# Patient Record
Sex: MALE | Race: WHITE | NOT HISPANIC OR LATINO | Employment: OTHER | URBAN - METROPOLITAN AREA
[De-identification: names, ages, dates, MRNs, and addresses within clinical notes are randomized per-mention and may not be internally consistent; named-entity substitution may affect disease eponyms.]

---

## 2021-11-17 ENCOUNTER — TRANSCRIBE ORDERS (OUTPATIENT)
Dept: ADMINISTRATIVE | Facility: HOSPITAL | Age: 71
End: 2021-11-17

## 2021-11-17 DIAGNOSIS — I20.0 INTERMEDIATE CORONARY SYNDROME (HCC): Primary | ICD-10-CM

## 2021-11-24 ENCOUNTER — ANESTHESIA EVENT (OUTPATIENT)
Dept: GASTROENTEROLOGY | Facility: HOSPITAL | Age: 71
End: 2021-11-24

## 2021-11-24 ENCOUNTER — HOSPITAL ENCOUNTER (OUTPATIENT)
Facility: HOSPITAL | Age: 71
Discharge: HOME | End: 2021-11-25
Attending: INTERNAL MEDICINE | Admitting: INTERNAL MEDICINE

## 2021-11-24 ENCOUNTER — ANESTHESIA (OUTPATIENT)
Dept: GASTROENTEROLOGY | Facility: HOSPITAL | Age: 71
End: 2021-11-24

## 2021-11-24 DIAGNOSIS — I20.0 INTERMEDIATE CORONARY SYNDROME (HCC): ICD-10-CM

## 2021-11-24 DIAGNOSIS — Z87.898 HISTORY OF ULCER DISEASE: ICD-10-CM

## 2021-11-24 DIAGNOSIS — D64.9 ANEMIA, UNSPECIFIED TYPE: Primary | ICD-10-CM

## 2021-11-24 LAB
ABO GROUP BLD: NORMAL
ANION GAP SERPL CALCULATED.3IONS-SCNC: 11 MMOL/L (ref 5–15)
BLD GP AB SCN SERPL QL: NEGATIVE
BUN SERPL-MCNC: 15 MG/DL (ref 8–23)
BUN/CREAT SERPL: 18.1 (ref 7–25)
CALCIUM SPEC-SCNC: 8.7 MG/DL (ref 8.6–10.5)
CHLORIDE SERPL-SCNC: 109 MMOL/L (ref 98–107)
CO2 SERPL-SCNC: 24 MMOL/L (ref 22–29)
CREAT SERPL-MCNC: 0.83 MG/DL (ref 0.76–1.27)
DEPRECATED RDW RBC AUTO: 46.9 FL (ref 37–54)
ERYTHROCYTE [DISTWIDTH] IN BLOOD BY AUTOMATED COUNT: 13.6 % (ref 12.3–15.4)
FERRITIN SERPL-MCNC: 237.2 NG/ML (ref 30–400)
GFR SERPL CREATININE-BSD FRML MDRD: 91 ML/MIN/1.73
GLUCOSE BLDC GLUCOMTR-MCNC: 104 MG/DL (ref 70–130)
GLUCOSE BLDC GLUCOMTR-MCNC: 104 MG/DL (ref 70–130)
GLUCOSE BLDC GLUCOMTR-MCNC: 128 MG/DL (ref 70–130)
GLUCOSE BLDC GLUCOMTR-MCNC: 138 MG/DL (ref 70–130)
GLUCOSE SERPL-MCNC: 143 MG/DL (ref 65–99)
HCT VFR BLD AUTO: 22.8 % (ref 37.5–51)
HGB BLD-MCNC: 7.7 G/DL (ref 13–17.7)
IRON 24H UR-MRATE: 63 MCG/DL (ref 59–158)
IRON SATN MFR SERPL: 23 % (ref 20–50)
MCH RBC QN AUTO: 32.1 PG (ref 26.6–33)
MCHC RBC AUTO-ENTMCNC: 33.8 G/DL (ref 31.5–35.7)
MCV RBC AUTO: 95 FL (ref 79–97)
PLATELET # BLD AUTO: 266 10*3/MM3 (ref 140–450)
PMV BLD AUTO: 9 FL (ref 6–12)
POTASSIUM SERPL-SCNC: 3.5 MMOL/L (ref 3.5–5.2)
RBC # BLD AUTO: 2.4 10*6/MM3 (ref 4.14–5.8)
RH BLD: POSITIVE
SARS-COV-2 RDRP RESP QL NAA+PROBE: NORMAL
SODIUM SERPL-SCNC: 144 MMOL/L (ref 136–145)
T&S EXPIRATION DATE: NORMAL
TIBC SERPL-MCNC: 270 MCG/DL (ref 298–536)
TRANSFERRIN SERPL-MCNC: 181 MG/DL (ref 200–360)
WBC NRBC COR # BLD: 6.64 10*3/MM3 (ref 3.4–10.8)

## 2021-11-24 PROCEDURE — 63710000001 FERROUS SULFATE 325 (65 FE) MG TABLET: Performed by: INTERNAL MEDICINE

## 2021-11-24 PROCEDURE — A9270 NON-COVERED ITEM OR SERVICE: HCPCS | Performed by: INTERNAL MEDICINE

## 2021-11-24 PROCEDURE — 0 LIDOCAINE 1 % SOLUTION: Performed by: NURSE ANESTHETIST, CERTIFIED REGISTERED

## 2021-11-24 PROCEDURE — 43239 EGD BIOPSY SINGLE/MULTIPLE: CPT | Performed by: INTERNAL MEDICINE

## 2021-11-24 PROCEDURE — 63710000001 METFORMIN ER 500 MG TABLET SUSTAINED-RELEASE 24 HOUR: Performed by: INTERNAL MEDICINE

## 2021-11-24 PROCEDURE — 85027 COMPLETE CBC AUTOMATED: CPT | Performed by: INTERNAL MEDICINE

## 2021-11-24 PROCEDURE — 99204 OFFICE O/P NEW MOD 45 MIN: CPT | Performed by: PHYSICIAN ASSISTANT

## 2021-11-24 PROCEDURE — 83540 ASSAY OF IRON: CPT | Performed by: PHYSICIAN ASSISTANT

## 2021-11-24 PROCEDURE — G0378 HOSPITAL OBSERVATION PER HR: HCPCS

## 2021-11-24 PROCEDURE — 86850 RBC ANTIBODY SCREEN: CPT | Performed by: PHYSICIAN ASSISTANT

## 2021-11-24 PROCEDURE — 86901 BLOOD TYPING SEROLOGIC RH(D): CPT | Performed by: PHYSICIAN ASSISTANT

## 2021-11-24 PROCEDURE — 63710000001 AMLODIPINE 5 MG TABLET: Performed by: INTERNAL MEDICINE

## 2021-11-24 PROCEDURE — 25010000002 FUROSEMIDE PER 20 MG: Performed by: PHYSICIAN ASSISTANT

## 2021-11-24 PROCEDURE — 25010000002 PROPOFOL 10 MG/ML EMULSION: Performed by: NURSE ANESTHETIST, CERTIFIED REGISTERED

## 2021-11-24 PROCEDURE — 82962 GLUCOSE BLOOD TEST: CPT

## 2021-11-24 PROCEDURE — 63710000001 PANTOPRAZOLE 40 MG TABLET DELAYED-RELEASE: Performed by: INTERNAL MEDICINE

## 2021-11-24 PROCEDURE — 84466 ASSAY OF TRANSFERRIN: CPT | Performed by: PHYSICIAN ASSISTANT

## 2021-11-24 PROCEDURE — 88305 TISSUE EXAM BY PATHOLOGIST: CPT | Performed by: INTERNAL MEDICINE

## 2021-11-24 PROCEDURE — 87635 SARS-COV-2 COVID-19 AMP PRB: CPT | Performed by: INTERNAL MEDICINE

## 2021-11-24 PROCEDURE — 63710000001 RANOLAZINE 500 MG TABLET SUSTAINED-RELEASE 12 HOUR: Performed by: INTERNAL MEDICINE

## 2021-11-24 PROCEDURE — 63710000001 ATORVASTATIN 40 MG TABLET: Performed by: INTERNAL MEDICINE

## 2021-11-24 PROCEDURE — 86900 BLOOD TYPING SEROLOGIC ABO: CPT

## 2021-11-24 PROCEDURE — 36430 TRANSFUSION BLD/BLD COMPNT: CPT

## 2021-11-24 PROCEDURE — 86920 COMPATIBILITY TEST SPIN: CPT

## 2021-11-24 PROCEDURE — 63710000001 SUCRALFATE 1 G TABLET: Performed by: INTERNAL MEDICINE

## 2021-11-24 PROCEDURE — 63710000001 CLOPIDOGREL 75 MG TABLET: Performed by: INTERNAL MEDICINE

## 2021-11-24 PROCEDURE — 63710000001 TAMSULOSIN 0.4 MG CAPSULE: Performed by: INTERNAL MEDICINE

## 2021-11-24 PROCEDURE — 80048 BASIC METABOLIC PNL TOTAL CA: CPT | Performed by: INTERNAL MEDICINE

## 2021-11-24 PROCEDURE — P9016 RBC LEUKOCYTES REDUCED: HCPCS

## 2021-11-24 PROCEDURE — 86900 BLOOD TYPING SEROLOGIC ABO: CPT | Performed by: PHYSICIAN ASSISTANT

## 2021-11-24 PROCEDURE — 82728 ASSAY OF FERRITIN: CPT | Performed by: INTERNAL MEDICINE

## 2021-11-24 PROCEDURE — C9803 HOPD COVID-19 SPEC COLLECT: HCPCS

## 2021-11-24 PROCEDURE — 63710000001 GLIPIZIDE 10 MG TABLET: Performed by: INTERNAL MEDICINE

## 2021-11-24 RX ORDER — TAMSULOSIN HYDROCHLORIDE 0.4 MG/1
0.4 CAPSULE ORAL DAILY
COMMUNITY

## 2021-11-24 RX ORDER — ONDANSETRON 2 MG/ML
4 INJECTION INTRAMUSCULAR; INTRAVENOUS ONCE AS NEEDED
Status: DISCONTINUED | OUTPATIENT
Start: 2021-11-24 | End: 2021-11-24 | Stop reason: HOSPADM

## 2021-11-24 RX ORDER — GLIPIZIDE 10 MG/1
10 TABLET ORAL DAILY
COMMUNITY

## 2021-11-24 RX ORDER — FUROSEMIDE 10 MG/ML
40 INJECTION INTRAMUSCULAR; INTRAVENOUS ONCE
Status: COMPLETED | OUTPATIENT
Start: 2021-11-24 | End: 2021-11-24

## 2021-11-24 RX ORDER — ASPIRIN 325 MG
325 TABLET, DELAYED RELEASE (ENTERIC COATED) ORAL DAILY
Status: DISCONTINUED | OUTPATIENT
Start: 2021-11-24 | End: 2021-11-25

## 2021-11-24 RX ORDER — LOSARTAN POTASSIUM 100 MG/1
1 TABLET ORAL DAILY
COMMUNITY

## 2021-11-24 RX ORDER — TAMSULOSIN HYDROCHLORIDE 0.4 MG/1
0.4 CAPSULE ORAL NIGHTLY
Status: DISCONTINUED | OUTPATIENT
Start: 2021-11-24 | End: 2021-11-25 | Stop reason: HOSPADM

## 2021-11-24 RX ORDER — LABETALOL HYDROCHLORIDE 5 MG/ML
5 INJECTION, SOLUTION INTRAVENOUS
Status: DISCONTINUED | OUTPATIENT
Start: 2021-11-24 | End: 2021-11-24 | Stop reason: HOSPADM

## 2021-11-24 RX ORDER — ATORVASTATIN CALCIUM 40 MG/1
1 TABLET, FILM COATED ORAL NIGHTLY
COMMUNITY

## 2021-11-24 RX ORDER — PANTOPRAZOLE SODIUM 40 MG/1
40 TABLET, DELAYED RELEASE ORAL 2 TIMES DAILY
Status: DISCONTINUED | OUTPATIENT
Start: 2021-11-24 | End: 2021-11-25 | Stop reason: HOSPADM

## 2021-11-24 RX ORDER — PANTOPRAZOLE SODIUM 40 MG/1
40 TABLET, DELAYED RELEASE ORAL 2 TIMES DAILY
COMMUNITY

## 2021-11-24 RX ORDER — HYDROCHLOROTHIAZIDE 25 MG/1
25 TABLET ORAL DAILY
Status: DISCONTINUED | OUTPATIENT
Start: 2021-11-25 | End: 2021-11-25 | Stop reason: HOSPADM

## 2021-11-24 RX ORDER — METOPROLOL SUCCINATE 25 MG/1
1 TABLET, EXTENDED RELEASE ORAL DAILY
COMMUNITY

## 2021-11-24 RX ORDER — HYDROCODONE BITARTRATE AND ACETAMINOPHEN 10; 325 MG/1; MG/1
1 TABLET ORAL 4 TIMES DAILY PRN
Status: DISCONTINUED | OUTPATIENT
Start: 2021-11-24 | End: 2021-11-25 | Stop reason: HOSPADM

## 2021-11-24 RX ORDER — RANOLAZINE 500 MG/1
500 TABLET, EXTENDED RELEASE ORAL 2 TIMES DAILY
COMMUNITY

## 2021-11-24 RX ORDER — CLOPIDOGREL BISULFATE 75 MG/1
1 TABLET ORAL DAILY
COMMUNITY

## 2021-11-24 RX ORDER — AMLODIPINE BESYLATE 5 MG/1
5 TABLET ORAL DAILY
COMMUNITY

## 2021-11-24 RX ORDER — FERROUS SULFATE 325(65) MG
325 TABLET ORAL DAILY
Status: DISCONTINUED | OUTPATIENT
Start: 2021-11-24 | End: 2021-11-25 | Stop reason: HOSPADM

## 2021-11-24 RX ORDER — ZOLPIDEM TARTRATE 5 MG/1
10 TABLET ORAL NIGHTLY PRN
Status: DISCONTINUED | OUTPATIENT
Start: 2021-11-24 | End: 2021-11-25 | Stop reason: HOSPADM

## 2021-11-24 RX ORDER — METOPROLOL SUCCINATE 25 MG/1
25 TABLET, EXTENDED RELEASE ORAL DAILY
Status: DISCONTINUED | OUTPATIENT
Start: 2021-11-25 | End: 2021-11-25

## 2021-11-24 RX ORDER — FERROUS SULFATE 325(65) MG
1 TABLET ORAL DAILY
COMMUNITY

## 2021-11-24 RX ORDER — GLIPIZIDE 10 MG/1
10 TABLET ORAL DAILY
Status: DISCONTINUED | OUTPATIENT
Start: 2021-11-24 | End: 2021-11-25 | Stop reason: HOSPADM

## 2021-11-24 RX ORDER — METFORMIN HYDROCHLORIDE 500 MG/1
1000 TABLET, EXTENDED RELEASE ORAL 2 TIMES DAILY
Status: DISCONTINUED | OUTPATIENT
Start: 2021-11-24 | End: 2021-11-25 | Stop reason: HOSPADM

## 2021-11-24 RX ORDER — HYDROCHLOROTHIAZIDE 25 MG/1
1 TABLET ORAL DAILY
COMMUNITY

## 2021-11-24 RX ORDER — PROPOFOL 10 MG/ML
VIAL (ML) INTRAVENOUS AS NEEDED
Status: DISCONTINUED | OUTPATIENT
Start: 2021-11-24 | End: 2021-11-24 | Stop reason: SURG

## 2021-11-24 RX ORDER — LIDOCAINE HYDROCHLORIDE 10 MG/ML
INJECTION, SOLUTION INFILTRATION; PERINEURAL AS NEEDED
Status: DISCONTINUED | OUTPATIENT
Start: 2021-11-24 | End: 2021-11-24 | Stop reason: SURG

## 2021-11-24 RX ORDER — METFORMIN HYDROCHLORIDE 500 MG/1
1000 TABLET, EXTENDED RELEASE ORAL 2 TIMES DAILY
COMMUNITY

## 2021-11-24 RX ORDER — ZOLPIDEM TARTRATE 10 MG/1
1 TABLET ORAL NIGHTLY PRN
COMMUNITY

## 2021-11-24 RX ORDER — CLOPIDOGREL BISULFATE 75 MG/1
75 TABLET ORAL DAILY
Status: DISCONTINUED | OUTPATIENT
Start: 2021-11-24 | End: 2021-11-25 | Stop reason: HOSPADM

## 2021-11-24 RX ORDER — LOSARTAN POTASSIUM 50 MG/1
100 TABLET ORAL DAILY
Status: DISCONTINUED | OUTPATIENT
Start: 2021-11-25 | End: 2021-11-25 | Stop reason: HOSPADM

## 2021-11-24 RX ORDER — NICOTINE POLACRILEX 4 MG
15 LOZENGE BUCCAL
Status: DISCONTINUED | OUTPATIENT
Start: 2021-11-24 | End: 2021-11-25 | Stop reason: HOSPADM

## 2021-11-24 RX ORDER — ATORVASTATIN CALCIUM 40 MG/1
40 TABLET, FILM COATED ORAL NIGHTLY
Status: DISCONTINUED | OUTPATIENT
Start: 2021-11-24 | End: 2021-11-25 | Stop reason: HOSPADM

## 2021-11-24 RX ORDER — SUCRALFATE 1 G/1
1 TABLET ORAL 4 TIMES DAILY
COMMUNITY
End: 2021-12-15

## 2021-11-24 RX ORDER — RANOLAZINE 500 MG/1
500 TABLET, EXTENDED RELEASE ORAL 2 TIMES DAILY
Status: DISCONTINUED | OUTPATIENT
Start: 2021-11-24 | End: 2021-11-25

## 2021-11-24 RX ORDER — AMLODIPINE BESYLATE 5 MG/1
5 TABLET ORAL DAILY
Status: DISCONTINUED | OUTPATIENT
Start: 2021-11-24 | End: 2021-11-25

## 2021-11-24 RX ORDER — SUCRALFATE 1 G/1
1 TABLET ORAL
Status: DISCONTINUED | OUTPATIENT
Start: 2021-11-24 | End: 2021-11-25 | Stop reason: HOSPADM

## 2021-11-24 RX ORDER — HYDROCODONE BITARTRATE AND ACETAMINOPHEN 10; 325 MG/1; MG/1
1 TABLET ORAL 4 TIMES DAILY PRN
COMMUNITY

## 2021-11-24 RX ORDER — DEXTROSE MONOHYDRATE 25 G/50ML
25 INJECTION, SOLUTION INTRAVENOUS
Status: DISCONTINUED | OUTPATIENT
Start: 2021-11-24 | End: 2021-11-25 | Stop reason: HOSPADM

## 2021-11-24 RX ORDER — SODIUM CHLORIDE, SODIUM LACTATE, POTASSIUM CHLORIDE, CALCIUM CHLORIDE 600; 310; 30; 20 MG/100ML; MG/100ML; MG/100ML; MG/100ML
INJECTION, SOLUTION INTRAVENOUS CONTINUOUS PRN
Status: DISCONTINUED | OUTPATIENT
Start: 2021-11-24 | End: 2021-11-24 | Stop reason: SURG

## 2021-11-24 RX ADMIN — PROPOFOL 70 MG: 10 INJECTION, EMULSION INTRAVENOUS at 15:20

## 2021-11-24 RX ADMIN — SUCRALFATE 1 G: 1 TABLET ORAL at 20:08

## 2021-11-24 RX ADMIN — AMLODIPINE BESYLATE 5 MG: 5 TABLET ORAL at 17:25

## 2021-11-24 RX ADMIN — CLOPIDOGREL BISULFATE 75 MG: 75 TABLET ORAL at 17:25

## 2021-11-24 RX ADMIN — GLIPIZIDE 10 MG: 10 TABLET ORAL at 17:25

## 2021-11-24 RX ADMIN — PANTOPRAZOLE SODIUM 40 MG: 40 TABLET, DELAYED RELEASE ORAL at 20:09

## 2021-11-24 RX ADMIN — RANOLAZINE 500 MG: 500 TABLET, EXTENDED RELEASE ORAL at 20:08

## 2021-11-24 RX ADMIN — METFORMIN HYDROCHLORIDE 1000 MG: 500 TABLET, EXTENDED RELEASE ORAL at 20:08

## 2021-11-24 RX ADMIN — FUROSEMIDE 40 MG: 40 INJECTION, SOLUTION INTRAMUSCULAR; INTRAVENOUS at 17:25

## 2021-11-24 RX ADMIN — PROPOFOL 70 MG: 10 INJECTION, EMULSION INTRAVENOUS at 15:16

## 2021-11-24 RX ADMIN — TAMSULOSIN HYDROCHLORIDE 0.4 MG: 0.4 CAPSULE ORAL at 20:09

## 2021-11-24 RX ADMIN — LIDOCAINE HYDROCHLORIDE 70 MG: 10 INJECTION, SOLUTION INFILTRATION; PERINEURAL at 15:16

## 2021-11-24 RX ADMIN — SODIUM CHLORIDE, POTASSIUM CHLORIDE, SODIUM LACTATE AND CALCIUM CHLORIDE: 600; 310; 30; 20 INJECTION, SOLUTION INTRAVENOUS at 15:11

## 2021-11-24 RX ADMIN — FERROUS SULFATE TAB 325 MG (65 MG ELEMENTAL FE) 325 MG: 325 (65 FE) TAB at 17:25

## 2021-11-24 RX ADMIN — ATORVASTATIN CALCIUM 40 MG: 40 TABLET, FILM COATED ORAL at 20:09

## 2021-11-24 RX ADMIN — SUCRALFATE 1 G: 1 TABLET ORAL at 17:25

## 2021-11-24 NOTE — ANESTHESIA POSTPROCEDURE EVALUATION
Patient: Pedro Wilson    Procedure Summary     Date: 11/24/21 Room / Location:  [facility]    Anesthesia Start: 1510 Anesthesia Stop:     Procedure: ESOPHAGOGASTRODUODENOSCOPY (N/A ) Diagnosis:     Surgeons: Lance Walton MD Provider: Bib Cruz MD    Anesthesia Type: general ASA Status: 3          Anesthesia Type: general    Vitals  Vitals Value Taken Time   /74 11/24/21 1531   Temp 97.9 °F (36.6 °C) 11/24/21 1531   Pulse 57 11/24/21 1531   Resp 24 11/24/21 1531   SpO2 100 % 11/24/21 1531           Post Anesthesia Care and Evaluation    Patient location during evaluation: PACU  Patient participation: complete - patient participated  Level of consciousness: awake and alert  Pain score: 0  Pain management: adequate  Airway patency: patent  Anesthetic complications: No anesthetic complications  PONV Status: none  Cardiovascular status: hemodynamically stable and acceptable  Respiratory status: nonlabored ventilation, acceptable and nasal cannula  Hydration status: acceptable    Comments: Pt transferred to PACU with O2. Vital signs stable. Report to PACU RN and care accepted.

## 2021-11-24 NOTE — ANESTHESIA PREPROCEDURE EVALUATION
Anesthesia Evaluation     Patient summary reviewed and Nursing notes reviewed   no history of anesthetic complications:  NPO Solid Status: > 8 hours  NPO Liquid Status: > 8 hours           Airway   Mallampati: II  TM distance: >3 FB  Neck ROM: full  No difficulty expected  Dental      Pulmonary - negative pulmonary ROS and normal exam   Cardiovascular - normal exam    (+) hypertension, past MI , CAD, CABG, angina, hyperlipidemia,  carotid artery disease      Neuro/Psych  (+) TIA,     GI/Hepatic/Renal/Endo    (+)  GERD, GI bleeding , diabetes mellitus,     Musculoskeletal     (+) back pain,   Abdominal    Substance History      OB/GYN          Other                        Anesthesia Plan    ASA 3     general     intravenous induction     Anesthetic plan, all risks, benefits, and alternatives have been provided, discussed and informed consent has been obtained with: patient.    Plan discussed with CRNA.

## 2021-11-25 VITALS
OXYGEN SATURATION: 97 % | TEMPERATURE: 98.5 F | SYSTOLIC BLOOD PRESSURE: 136 MMHG | WEIGHT: 172.6 LBS | HEIGHT: 70 IN | DIASTOLIC BLOOD PRESSURE: 65 MMHG | HEART RATE: 66 BPM | RESPIRATION RATE: 16 BRPM | BODY MASS INDEX: 24.71 KG/M2

## 2021-11-25 LAB
BH BB BLOOD EXPIRATION DATE: NORMAL
BH BB BLOOD EXPIRATION DATE: NORMAL
BH BB BLOOD TYPE BARCODE: 6200
BH BB BLOOD TYPE BARCODE: 6200
BH BB DISPENSE STATUS: NORMAL
BH BB DISPENSE STATUS: NORMAL
BH BB PRODUCT CODE: NORMAL
BH BB PRODUCT CODE: NORMAL
BH BB UNIT NUMBER: NORMAL
BH BB UNIT NUMBER: NORMAL
CROSSMATCH INTERPRETATION: NORMAL
CROSSMATCH INTERPRETATION: NORMAL
DEPRECATED RDW RBC AUTO: 50.7 FL (ref 37–54)
ERYTHROCYTE [DISTWIDTH] IN BLOOD BY AUTOMATED COUNT: 15.6 % (ref 12.3–15.4)
FOLATE SERPL-MCNC: 13.1 NG/ML (ref 4.78–24.2)
GLUCOSE BLDC GLUCOMTR-MCNC: 193 MG/DL (ref 70–130)
GLUCOSE BLDC GLUCOMTR-MCNC: 84 MG/DL (ref 70–130)
HCT VFR BLD AUTO: 30 % (ref 37.5–51)
HEMOCCULT STL QL: NEGATIVE
HGB BLD-MCNC: 10.4 G/DL (ref 13–17.7)
MCH RBC QN AUTO: 30.9 PG (ref 26.6–33)
MCHC RBC AUTO-ENTMCNC: 34.7 G/DL (ref 31.5–35.7)
MCV RBC AUTO: 89 FL (ref 79–97)
PLATELET # BLD AUTO: 281 10*3/MM3 (ref 140–450)
PMV BLD AUTO: 9.3 FL (ref 6–12)
RBC # BLD AUTO: 3.37 10*6/MM3 (ref 4.14–5.8)
RETICS # AUTO: 0.04 10*6/MM3 (ref 0.02–0.13)
RETICS/RBC NFR AUTO: 1.17 % (ref 0.7–1.9)
UNIT  ABO: NORMAL
UNIT  ABO: NORMAL
UNIT  RH: NORMAL
UNIT  RH: NORMAL
VIT B12 BLD-MCNC: 346 PG/ML (ref 211–946)
WBC NRBC COR # BLD: 7.28 10*3/MM3 (ref 3.4–10.8)

## 2021-11-25 PROCEDURE — A9270 NON-COVERED ITEM OR SERVICE: HCPCS | Performed by: INTERNAL MEDICINE

## 2021-11-25 PROCEDURE — 63710000001 AMLODIPINE 5 MG TABLET: Performed by: INTERNAL MEDICINE

## 2021-11-25 PROCEDURE — 99203 OFFICE O/P NEW LOW 30 MIN: CPT | Performed by: INTERNAL MEDICINE

## 2021-11-25 PROCEDURE — 63710000001 ASPIRIN EC 325 MG TABLET DELAYED-RELEASE: Performed by: INTERNAL MEDICINE

## 2021-11-25 PROCEDURE — 63710000001 METOPROLOL SUCCINATE XL 25 MG TABLET SUSTAINED-RELEASE 24 HOUR: Performed by: INTERNAL MEDICINE

## 2021-11-25 PROCEDURE — 63710000001 RANOLAZINE 500 MG TABLET SUSTAINED-RELEASE 12 HOUR: Performed by: INTERNAL MEDICINE

## 2021-11-25 PROCEDURE — 82607 VITAMIN B-12: CPT | Performed by: INTERNAL MEDICINE

## 2021-11-25 PROCEDURE — 63710000001 CLOPIDOGREL 75 MG TABLET: Performed by: INTERNAL MEDICINE

## 2021-11-25 PROCEDURE — 63710000001 PANTOPRAZOLE 40 MG TABLET DELAYED-RELEASE: Performed by: INTERNAL MEDICINE

## 2021-11-25 PROCEDURE — 82272 OCCULT BLD FECES 1-3 TESTS: CPT | Performed by: INTERNAL MEDICINE

## 2021-11-25 PROCEDURE — 82962 GLUCOSE BLOOD TEST: CPT

## 2021-11-25 PROCEDURE — 63710000001 HYDROCODONE-ACETAMINOPHEN 10-325 MG TABLET: Performed by: INTERNAL MEDICINE

## 2021-11-25 PROCEDURE — 63710000001 LOSARTAN 50 MG TABLET: Performed by: INTERNAL MEDICINE

## 2021-11-25 PROCEDURE — 63710000001 HYDROCHLOROTHIAZIDE 25 MG TABLET: Performed by: INTERNAL MEDICINE

## 2021-11-25 PROCEDURE — G0378 HOSPITAL OBSERVATION PER HR: HCPCS

## 2021-11-25 PROCEDURE — 85027 COMPLETE CBC AUTOMATED: CPT | Performed by: INTERNAL MEDICINE

## 2021-11-25 PROCEDURE — 82746 ASSAY OF FOLIC ACID SERUM: CPT | Performed by: INTERNAL MEDICINE

## 2021-11-25 PROCEDURE — 85045 AUTOMATED RETICULOCYTE COUNT: CPT | Performed by: INTERNAL MEDICINE

## 2021-11-25 PROCEDURE — 63710000001 METFORMIN ER 500 MG TABLET SUSTAINED-RELEASE 24 HOUR: Performed by: INTERNAL MEDICINE

## 2021-11-25 PROCEDURE — 63710000001 GLIPIZIDE 10 MG TABLET: Performed by: INTERNAL MEDICINE

## 2021-11-25 PROCEDURE — 63710000001 FERROUS SULFATE 325 (65 FE) MG TABLET: Performed by: INTERNAL MEDICINE

## 2021-11-25 PROCEDURE — 63710000001 SUCRALFATE 1 G TABLET: Performed by: INTERNAL MEDICINE

## 2021-11-25 RX ORDER — RANOLAZINE 500 MG/1
1000 TABLET, EXTENDED RELEASE ORAL 2 TIMES DAILY
Status: DISCONTINUED | OUTPATIENT
Start: 2021-11-25 | End: 2021-11-25

## 2021-11-25 RX ORDER — ASPIRIN 81 MG/1
81 TABLET ORAL DAILY
Qty: 30 TABLET | Refills: 5 | Status: SHIPPED | OUTPATIENT
Start: 2021-11-26

## 2021-11-25 RX ORDER — AMLODIPINE BESYLATE 10 MG/1
10 TABLET ORAL DAILY
Status: DISCONTINUED | OUTPATIENT
Start: 2021-11-26 | End: 2021-11-25

## 2021-11-25 RX ORDER — METOPROLOL SUCCINATE 25 MG/1
25 TABLET, EXTENDED RELEASE ORAL
Status: DISCONTINUED | OUTPATIENT
Start: 2021-11-25 | End: 2021-11-25

## 2021-11-25 RX ORDER — RANOLAZINE 500 MG/1
500 TABLET, EXTENDED RELEASE ORAL 2 TIMES DAILY
Status: DISCONTINUED | OUTPATIENT
Start: 2021-11-25 | End: 2021-11-25 | Stop reason: HOSPADM

## 2021-11-25 RX ORDER — ASPIRIN 81 MG/1
81 TABLET ORAL DAILY
Status: DISCONTINUED | OUTPATIENT
Start: 2021-11-26 | End: 2021-11-25 | Stop reason: HOSPADM

## 2021-11-25 RX ORDER — AMLODIPINE BESYLATE 5 MG/1
5 TABLET ORAL DAILY
Status: DISCONTINUED | OUTPATIENT
Start: 2021-11-26 | End: 2021-11-25 | Stop reason: HOSPADM

## 2021-11-25 RX ADMIN — ASPIRIN 325 MG: 325 TABLET, COATED ORAL at 08:08

## 2021-11-25 RX ADMIN — HYDROCHLOROTHIAZIDE 25 MG: 25 TABLET ORAL at 08:08

## 2021-11-25 RX ADMIN — METOPROLOL SUCCINATE 25 MG: 25 TABLET, EXTENDED RELEASE ORAL at 08:08

## 2021-11-25 RX ADMIN — RANOLAZINE 500 MG: 500 TABLET, EXTENDED RELEASE ORAL at 08:08

## 2021-11-25 RX ADMIN — PANTOPRAZOLE SODIUM 40 MG: 40 TABLET, DELAYED RELEASE ORAL at 08:08

## 2021-11-25 RX ADMIN — SUCRALFATE 1 G: 1 TABLET ORAL at 08:08

## 2021-11-25 RX ADMIN — HYDROCODONE BITARTRATE AND ACETAMINOPHEN 1 TABLET: 10; 325 TABLET ORAL at 11:21

## 2021-11-25 RX ADMIN — METFORMIN HYDROCHLORIDE 1000 MG: 500 TABLET, EXTENDED RELEASE ORAL at 08:07

## 2021-11-25 RX ADMIN — GLIPIZIDE 10 MG: 10 TABLET ORAL at 08:08

## 2021-11-25 RX ADMIN — HYDROCODONE BITARTRATE AND ACETAMINOPHEN 1 TABLET: 10; 325 TABLET ORAL at 06:38

## 2021-11-25 RX ADMIN — FERROUS SULFATE TAB 325 MG (65 MG ELEMENTAL FE) 325 MG: 325 (65 FE) TAB at 08:08

## 2021-11-25 RX ADMIN — LOSARTAN POTASSIUM 100 MG: 50 TABLET, FILM COATED ORAL at 08:07

## 2021-11-25 RX ADMIN — CLOPIDOGREL BISULFATE 75 MG: 75 TABLET ORAL at 08:08

## 2021-11-25 RX ADMIN — AMLODIPINE BESYLATE 5 MG: 5 TABLET ORAL at 08:08

## 2021-11-29 LAB
CYTO UR: NORMAL
LAB AP CASE REPORT: NORMAL
LAB AP CLINICAL INFORMATION: NORMAL
PATH REPORT.FINAL DX SPEC: NORMAL
PATH REPORT.GROSS SPEC: NORMAL

## 2021-11-30 ENCOUNTER — TELEPHONE (OUTPATIENT)
Dept: GASTROENTEROLOGY | Facility: CLINIC | Age: 71
End: 2021-11-30

## 2021-11-30 DIAGNOSIS — K92.1 BLOOD IN STOOL: Primary | ICD-10-CM

## 2021-11-30 DIAGNOSIS — D64.9 ANEMIA, UNSPECIFIED TYPE: ICD-10-CM

## 2021-12-02 NOTE — PROGRESS NOTES
"[facility], [facility]    Chief Complaint  Chest Pain    Subjective    History of Present Illness {CC  Problem List  Visit  Diagnosis   Encounters  Notes  Medications  Labs  Result Review Imaging  Media :23}     Pedro Wilson presents to [facility] for   History of Present Illness     71-year-old male presented to [facility] on 11/24/2021 for chest pain.  Found to be anemic.  Heart cath was scheduled canceled due to anemia.  EGD completed no clear bleeding site.  Received 2 units of packed red blood cells.    Hx of CAD, HTN, HLP, DM.    Patient with history of aortic aneurysm on beta-blocker.  Noted to be bradycardic but stable.    Patient followed by Dr. Paez    No CP. Mild pressure (intermittent for several years).  Improved since being amlodipine and ranexa.  Baseline mild dizziness with change in position (for 10 years).  No syncope.  NO dyspnea.  Pt reports he is as his baseline.        Objective     Vital Signs:   Vitals:    12/03/21 0952 12/03/21 0954 12/03/21 0955   BP: 125/55 93/52 117/57   BP Location: Right arm Left arm Left arm   Patient Position: Sitting Standing Sitting   Cuff Size: Adult Adult    Pulse: 57 59 57   Resp:   15   Temp:   97.7 °F (36.5 °C)   TempSrc:   Temporal   SpO2: 94% 96% 96%   Weight:   80.1 kg (176 lb 8 oz)   Height:   177.8 cm (70\")     Body mass index is 25.33 kg/m².  Physical Exam  Vitals reviewed.   Constitutional:       General: He is not in acute distress.     Appearance: Normal appearance.   Cardiovascular:      Rate and Rhythm: Normal rate and regular rhythm.      Pulses:           Radial pulses are 2+ on the right side.        Dorsalis pedis pulses are 2+ on the right side.        Posterior tibial pulses are 2+ on the right side.      Heart sounds: Normal heart sounds.   Pulmonary:      Effort: Pulmonary effort is normal.      Breath sounds: Normal breath sounds.   Abdominal:      " Palpations: Abdomen is soft.      Tenderness: There is no abdominal tenderness.   Musculoskeletal:      Right lower leg: No edema.      Left lower leg: No edema.   Skin:     General: Skin is warm and dry.   Neurological:      Mental Status: He is alert.   Psychiatric:         Mood and Affect: Mood normal.         Behavior: Behavior is cooperative.              Result Review  Data Reviewed:{ Labs  Result Review  Imaging  Med Tab  Media :23}     Lab Results   Component Value Date    WBC 7.28 11/25/2021    HGB 10.4 (L) 11/25/2021    HCT 30.0 (L) 11/25/2021    MCV 89.0 11/25/2021     11/25/2021     Lab Results   Component Value Date    GLUCOSE 143 (H) 11/24/2021    CALCIUM 8.7 11/24/2021     11/24/2021    K 3.5 11/24/2021    CO2 24.0 11/24/2021     (H) 11/24/2021    BUN 15 11/24/2021    CREATININE 0.83 11/24/2021    EGFRIFNONA 91 11/24/2021    BCR 18.1 11/24/2021    ANIONGAP 11.0 11/24/2021     No results found for: TSH  Lab Results   Component Value Date    CHLPL 118 07/12/2021     Lab Results   Component Value Date    TRIG 80 07/12/2021     Lab Results   Component Value Date    HDL 44 07/12/2021     Lab Results   Component Value Date    LDL 58 07/12/2021     Plan to have repeat labs with PCP next week.      Assessment and Plan {CC Problem List  Visit Diagnosis  ROS  Review (Popup)  Health Maintenance  Quality  BestPractice  Medications  SmartSets  SnapShot Encounters  Media :23}   1. Chest pain, unspecified type  Baseline mild intermittent CP  Continue asa, statin, plavix,   ranexa    2. Primary hypertension  Losartan, HCTZ, metoprolol, amlodipine  Low normal  No s/s hypotenstion    3. Hyperlipidemia, unspecified hyperlipidemia type  statin    4. Bradycardia  Stable.    Pt to f/u with Philippe ramirez scheduled for continue management    Reviewed role of the [facility].  F/u as needed or as determined by cardiology.           Follow Up {Instructions Charge Capture  Follow-up  Communications :23}   Return if symptoms worsen or fail to improve.    Patient was given instructions and counseling regarding his condition or for health maintenance advice. Please see specific information pulled into the AVS if appropriate.  Patient was instructed to call the [facility] with any questions, concerns, or worsening symptoms.

## 2021-12-03 ENCOUNTER — OFFICE VISIT (OUTPATIENT)
Dept: CARDIOLOGY | Facility: HOSPITAL | Age: 71
End: 2021-12-03

## 2021-12-03 VITALS
HEIGHT: 70 IN | RESPIRATION RATE: 15 BRPM | TEMPERATURE: 97.7 F | DIASTOLIC BLOOD PRESSURE: 57 MMHG | OXYGEN SATURATION: 96 % | WEIGHT: 176.5 LBS | HEART RATE: 57 BPM | BODY MASS INDEX: 25.27 KG/M2 | SYSTOLIC BLOOD PRESSURE: 117 MMHG

## 2021-12-03 DIAGNOSIS — R07.9 CHEST PAIN, UNSPECIFIED TYPE: Primary | ICD-10-CM

## 2021-12-03 DIAGNOSIS — E78.5 HYPERLIPIDEMIA, UNSPECIFIED HYPERLIPIDEMIA TYPE: ICD-10-CM

## 2021-12-03 DIAGNOSIS — I10 PRIMARY HYPERTENSION: ICD-10-CM

## 2021-12-03 DIAGNOSIS — R00.1 BRADYCARDIA: ICD-10-CM

## 2021-12-03 PROCEDURE — 99214 OFFICE O/P EST MOD 30 MIN: CPT | Performed by: NURSE PRACTITIONER

## 2021-12-08 ENCOUNTER — PATIENT MESSAGE (OUTPATIENT)
Dept: GASTROENTEROLOGY | Facility: CLINIC | Age: 71
End: 2021-12-08

## 2021-12-09 NOTE — TELEPHONE ENCOUNTER
From: MEGHAN BONNER  To: Pedro Wilson  Sent: 12/8/2021 12:35 PM EST  Subject: OCCULT STOOL TEST ORDER    Mr Wilson,  I tried to call you back concerning your occult stool test. Dr Pope has already ordered Occult stool test to check to see if you have blood in your stool. Thanks TIARA Guzman

## 2021-12-15 ENCOUNTER — OFFICE VISIT (OUTPATIENT)
Dept: GASTROENTEROLOGY | Facility: CLINIC | Age: 71
End: 2021-12-15

## 2021-12-15 ENCOUNTER — LAB (OUTPATIENT)
Dept: LAB | Facility: HOSPITAL | Age: 71
End: 2021-12-15

## 2021-12-15 VITALS
SYSTOLIC BLOOD PRESSURE: 126 MMHG | TEMPERATURE: 98 F | DIASTOLIC BLOOD PRESSURE: 66 MMHG | HEIGHT: 70 IN | WEIGHT: 178.8 LBS | HEART RATE: 62 BPM | BODY MASS INDEX: 25.6 KG/M2 | OXYGEN SATURATION: 98 %

## 2021-12-15 DIAGNOSIS — K21.9 GASTROESOPHAGEAL REFLUX DISEASE WITHOUT ESOPHAGITIS: ICD-10-CM

## 2021-12-15 DIAGNOSIS — K59.04 CHRONIC IDIOPATHIC CONSTIPATION: ICD-10-CM

## 2021-12-15 DIAGNOSIS — D64.9 ANEMIA, UNSPECIFIED TYPE: ICD-10-CM

## 2021-12-15 DIAGNOSIS — D64.9 ANEMIA, UNSPECIFIED TYPE: Primary | ICD-10-CM

## 2021-12-15 PROBLEM — E11.9 DIABETES MELLITUS: Status: ACTIVE | Noted: 2017-08-30

## 2021-12-15 PROBLEM — J84.10 FIBROSIS OF LUNG (HCC): Status: ACTIVE | Noted: 2017-04-05

## 2021-12-15 PROBLEM — R00.1 SINUS BRADYCARDIA: Status: ACTIVE | Noted: 2018-06-15

## 2021-12-15 PROBLEM — I71.21 ANEURYSM OF ASCENDING AORTA (HCC): Status: ACTIVE | Noted: 2017-09-25

## 2021-12-15 PROBLEM — G89.29 CHRONIC BACK PAIN: Status: ACTIVE | Noted: 2017-08-30

## 2021-12-15 PROBLEM — Z95.828 PRESENCE OF STENT IN ARTERY: Status: ACTIVE | Noted: 2017-08-30

## 2021-12-15 PROBLEM — I10 HYPERTENSIVE DISORDER: Status: ACTIVE | Noted: 2017-08-30

## 2021-12-15 PROBLEM — M54.9 CHRONIC BACK PAIN: Status: ACTIVE | Noted: 2017-08-30

## 2021-12-15 PROBLEM — R60.0 EDEMA OF LOWER EXTREMITY: Status: ACTIVE | Noted: 2017-08-30

## 2021-12-15 PROBLEM — R01.1 SYSTOLIC MURMUR: Status: ACTIVE | Noted: 2017-08-30

## 2021-12-15 PROBLEM — R93.89 ABNORMAL COMPUTED TOMOGRAPHY SCAN: Status: ACTIVE | Noted: 2017-05-03

## 2021-12-15 PROBLEM — I20.9 ANGINA PECTORIS (HCC): Status: ACTIVE | Noted: 2017-08-30

## 2021-12-15 PROBLEM — Z86.73 HISTORY OF TRANSIENT ISCHEMIC ATTACK: Status: ACTIVE | Noted: 2017-08-30

## 2021-12-15 PROBLEM — R06.00 DYSPNEA: Status: ACTIVE | Noted: 2017-04-05

## 2021-12-15 LAB
BASOPHILS # BLD AUTO: 0.05 10*3/MM3 (ref 0–0.2)
BASOPHILS NFR BLD AUTO: 0.6 % (ref 0–1.5)
DEPRECATED RDW RBC AUTO: 45.4 FL (ref 37–54)
EOSINOPHIL # BLD AUTO: 0.5 10*3/MM3 (ref 0–0.4)
EOSINOPHIL NFR BLD AUTO: 6.3 % (ref 0.3–6.2)
ERYTHROCYTE [DISTWIDTH] IN BLOOD BY AUTOMATED COUNT: 13.5 % (ref 12.3–15.4)
HCT VFR BLD AUTO: 32.6 % (ref 37.5–51)
HGB BLD-MCNC: 10.7 G/DL (ref 13–17.7)
IMM GRANULOCYTES # BLD AUTO: 0.03 10*3/MM3 (ref 0–0.05)
IMM GRANULOCYTES NFR BLD AUTO: 0.4 % (ref 0–0.5)
LYMPHOCYTES # BLD AUTO: 2.04 10*3/MM3 (ref 0.7–3.1)
LYMPHOCYTES NFR BLD AUTO: 25.5 % (ref 19.6–45.3)
MCH RBC QN AUTO: 30.6 PG (ref 26.6–33)
MCHC RBC AUTO-ENTMCNC: 32.8 G/DL (ref 31.5–35.7)
MCV RBC AUTO: 93.1 FL (ref 79–97)
MONOCYTES # BLD AUTO: 0.6 10*3/MM3 (ref 0.1–0.9)
MONOCYTES NFR BLD AUTO: 7.5 % (ref 5–12)
NEUTROPHILS NFR BLD AUTO: 4.77 10*3/MM3 (ref 1.7–7)
NEUTROPHILS NFR BLD AUTO: 59.7 % (ref 42.7–76)
NRBC BLD AUTO-RTO: 0.1 /100 WBC (ref 0–0.2)
PLATELET # BLD AUTO: 320 10*3/MM3 (ref 140–450)
PMV BLD AUTO: 9.5 FL (ref 6–12)
RBC # BLD AUTO: 3.5 10*6/MM3 (ref 4.14–5.8)
WBC NRBC COR # BLD: 7.99 10*3/MM3 (ref 3.4–10.8)

## 2021-12-15 PROCEDURE — 85025 COMPLETE CBC W/AUTO DIFF WBC: CPT

## 2021-12-15 PROCEDURE — 99214 OFFICE O/P EST MOD 30 MIN: CPT | Performed by: NURSE PRACTITIONER

## 2021-12-15 PROCEDURE — 36415 COLL VENOUS BLD VENIPUNCTURE: CPT

## 2021-12-15 RX ORDER — ISOSORBIDE MONONITRATE 120 MG/1
TABLET, EXTENDED RELEASE ORAL
COMMUNITY
Start: 2021-11-16

## 2021-12-15 RX ORDER — FAMOTIDINE 20 MG/1
TABLET, FILM COATED ORAL
COMMUNITY
Start: 2021-10-27

## 2021-12-15 RX ORDER — GABAPENTIN 800 MG/1
TABLET ORAL
COMMUNITY
Start: 2021-12-11

## 2021-12-15 RX ORDER — ESCITALOPRAM OXALATE 10 MG/1
TABLET ORAL
COMMUNITY
Start: 2021-10-15 | End: 2022-01-21

## 2021-12-15 NOTE — PROGRESS NOTES
New Patient Consultation     Patient Name: Pedro Wilson  : 1950   MRN: [id number]     Chief Complaint:    Chief Complaint   Patient presents with   • Hospital Follow Up Visit     follow up on Anemia       History of Present Illness: Pedro Wilson is a 71 y.o. male who is here today for a Gastroenterology Consultation for anemia.    Pedro presented to [facility] with chest pain for planned left heart cath and was found to be profoundly anemic.  The heart cath was canceled and GI was consulted.  He underwent an EGD that did not show any clear bleeding site.  He did have 1 stool that was negative for blood.  [facility] did [facility] the stools [facility] but has not heard results yet.  Pedro admits to a 5-year history of iron deficiency anemia.  He has undergone multiple scopes and no bleed source has ever been found.  He has never seen a hematologist.  Pedro has chronic epigastric pain and reflux.  Currently on pantoprazole twice daily, Carafate, and Pepcid.  EGD shows no evidence of reflux.  He is on amlodipine.  He is planned to undergo heart cath  There is no melena hematochezia.  His last colonoscopy was in [place] about 2 years ago that was normal.  He did have an EGD in the past that showed some scant blood (gastritis?)  No source of bleeding was found.  Pedro reports a 5 year history of anemia.  Thus far has had a colonoscopy (about 3-5 years ago) and an EGD (several).  He has never had a capsule endoscopy.  He has had several negative occult stools in the past.    Jr does admit to chronic constipation.  Has been taking fiber Gummies which are somewhat helpful.  Has never tried MiraLAX.    Subjective      Review of Systems:   Review of Systems   Constitutional: Negative for activity change, appetite change, chills, diaphoresis, fever, unexpected weight gain and unexpected weight loss.   HENT: Negative for trouble swallowing.    Gastrointestinal: Positive for abdominal pain, constipation, GERD and  indigestion. Negative for abdominal distention, anal bleeding, blood in stool, diarrhea, nausea, rectal pain and vomiting.       Past Medical History:   Past Medical History:   Diagnosis Date   • Back pain    • BPH (benign prostatic hyperplasia)    • Carotid artery occlusion    • Coronary artery disease    • Diabetes mellitus (HCC)    • Gastric bleeding    • GERD (gastroesophageal reflux disease)    • Herniated lumbar intervertebral disc    • Hyperlipidemia    • Hypertension    • Old MI (myocardial infarction)    • TIA (transient ischemic attack)        Past Surgical History:   Past Surgical History:   Procedure Laterality Date   • CARDIAC CATHETERIZATION     • COLONOSCOPY     • CORONARY ARTERY BYPASS GRAFT     • ENDOSCOPY N/A 11/24/2021    Procedure: ESOPHAGOGASTRODUODENOSCOPY;  Surgeon: Lance Walton MD;  Location: [facility];  Service: Gastroenterology;  Laterality: N/A;       Family History:   Family History   Problem Relation Age of Onset   • Heart disease Mother    • Heart disease Father    • No Known Problems Sister    • No Known Problems Brother        Social History:   Social History     Socioeconomic History   • Marital status:    Tobacco Use   • Smoking status: Never Smoker   • Smokeless tobacco: Never Used   Vaping Use   • Vaping Use: Never used   Substance and Sexual Activity   • Alcohol use: Never   • Drug use: Never   • Sexual activity: Defer       Alcohol/Tobacco History:   Social History     Substance and Sexual Activity   Alcohol Use Never     Social History     Tobacco Use   Smoking Status Never Smoker   Smokeless Tobacco Never Used       Medications:     Current Outpatient Medications:   •  amLODIPine (NORVASC) 5 MG tablet, Take 5 mg by mouth Daily., Disp: , Rfl:   •  aspirin 81 MG EC tablet, Take 1 tablet by mouth Daily., Disp: 30 tablet, Rfl: 5  •  atorvastatin (LIPITOR) 40 MG tablet, Take 1 tablet by mouth Every Night., Disp: , Rfl:   •  clopidogrel (Plavix) 75 MG tablet, Take  "1 tablet by mouth Daily., Disp: , Rfl:   •  escitalopram (LEXAPRO) 10 MG tablet, , Disp: , Rfl:   •  famotidine (PEPCID) 20 MG tablet, , Disp: , Rfl:   •  ferrous sulfate 325 (65 FE) MG tablet, Take 1 tablet by mouth Daily., Disp: , Rfl:   •  gabapentin (NEURONTIN) 800 MG tablet, , Disp: , Rfl:   •  glipizide (GLUCOTROL) 10 MG tablet, Take 10 mg by mouth Daily., Disp: , Rfl:   •  hydroCHLOROthiazide (HYDRODIURIL) 25 MG tablet, Take 1 tablet by mouth Daily., Disp: , Rfl:   •  HYDROcodone-acetaminophen (NORCO)  MG per tablet, Take 1 tablet by mouth 4 (Four) Times a Day As Needed for Moderate Pain ., Disp: , Rfl:   •  isosorbide mononitrate (IMDUR) 120 MG 24 hr tablet, , Disp: , Rfl:   •  losartan (COZAAR) 100 MG tablet, Take 1 tablet by mouth Daily., Disp: , Rfl:   •  metFORMIN ER (GLUCOPHAGE-XR) 500 MG 24 hr tablet, Take 1,000 mg by mouth 2 (Two) Times a Day., Disp: , Rfl:   •  metoprolol succinate XL (TOPROL-XL) 25 MG 24 hr tablet, Take 1 tablet by mouth Daily., Disp: , Rfl:   •  pantoprazole (PROTONIX) 40 MG EC tablet, Take 40 mg by mouth 2 (Two) Times a Day., Disp: , Rfl:   •  ranolazine (RANEXA) 500 MG 12 hr tablet, Take 500 mg by mouth 2 (Two) Times a Day., Disp: , Rfl:   •  tamsulosin (FLOMAX) 0.4 MG capsule 24 hr capsule, Take 0.4 mg by mouth Daily., Disp: , Rfl:   •  zolpidem (AMBIEN) 10 MG tablet, Take 1 tablet by mouth At Night As Needed for Sleep., Disp: , Rfl:     Allergies:   Allergies   Allergen Reactions   • Aspirin GI Bleeding       Objective     Physical Exam:  Vital Signs:   Vitals:    12/15/21 1022   BP: 126/66   BP Location: Left arm   Patient Position: Sitting   Cuff Size: Adult   Pulse: 62   Temp: 98 °F (36.7 °C)   TempSrc: Temporal   SpO2: 98%   Weight: 81.1 kg (178 lb 12.8 oz)   Height: 177.8 cm (70\")     Body mass index is 25.66 kg/m².     Physical Exam  Vitals and nursing note reviewed.   Constitutional:       General: He is not in acute distress.     Appearance: He is well-developed. " He is not diaphoretic.   Eyes:      General: No scleral icterus.     Conjunctiva/sclera: Conjunctivae normal.   Neck:      Thyroid: No thyromegaly.   Cardiovascular:      Rate and Rhythm: Normal rate and regular rhythm.  Extrasystoles are present.  Pulmonary:      Effort: Pulmonary effort is normal.      Breath sounds: Rhonchi present.   Abdominal:      General: Bowel sounds are normal. There is no distension.      Palpations: Abdomen is soft.      Tenderness: There is no abdominal tenderness. There is no guarding or rebound.      Hernia: No hernia is present.   Musculoskeletal:      Cervical back: Neck supple.      Right lower leg: No edema.      Left lower leg: No edema.   Skin:     General: Skin is warm and dry.      Capillary Refill: Capillary refill takes 2 to 3 seconds.      Coloration: Skin is not jaundiced or pale.      Findings: No bruising or petechiae.      Nails: There is no clubbing.   Neurological:      Mental Status: He is alert and oriented to person, place, and time.   Psychiatric:         Behavior: Behavior normal.         Thought Content: Thought content normal.         Judgment: Judgment normal.     Reviewed labs, reviewed hospital H&P, reviewed endoscopy report    Assessment / Plan      Assessment/Plan:   Diagnoses and all orders for this visit:    1. Anemia, unspecified type (Primary)  -     CBC & Differential; Future  -     Ambulatory Referral to Gastroenterology  -We will plan for capsule study.  If occult stools are positive, will switch to colonoscopy.  Occ[facility] are still in process [facility]  Capsule normal, will refer to hematology  2. Chronic idiopathic constipation  Increase dietary fiber, hydration, activity.  Start MiraLAX once daily  3. Gastroesophageal reflux disease without esophagitis  Suspect some of his indigestion and epigastric discomfort may be cardiac in nature and agree with need for heart cath.  Will discontinue Carafate.  Continue with PPI and Pepcid for now         Follow  Up: After capsule endoscopy  No follow-ups on file.    Plan of care reviewed with the patient at the conclusion of today's visit.  Education was provided regarding diagnosis, management, and any prescribed or recommended OTC medications.  Patient verbalized understanding of and agreement with management plan.     Time Statement:   Discussed plan of care in detail with patient today. Patient verbally understands and agrees. I have spent 30 minutes reviewing available diagnostics, obtaining history, examining the patient, developing a treatment plan, and educating the patient on disease process and plan of care.    MITESH Rutledge  [facility]

## 2021-12-17 ENCOUNTER — TELEPHONE (OUTPATIENT)
Dept: GASTROENTEROLOGY | Facility: CLINIC | Age: 71
End: 2021-12-17

## 2021-12-17 ENCOUNTER — PREP FOR SURGERY (OUTPATIENT)
Dept: OTHER | Facility: HOSPITAL | Age: 71
End: 2021-12-17

## 2021-12-17 DIAGNOSIS — D64.9 ANEMIA, UNSPECIFIED TYPE: Primary | ICD-10-CM

## 2021-12-17 RX ORDER — PEG-3350, SODIUM SULFATE, SODIUM CHLORIDE, POTASSIUM CHLORIDE, SODIUM ASCORBATE AND ASCORBIC ACID 7.5-2.691G
KIT ORAL
Qty: 1000 ML | Refills: 0 | Status: SHIPPED | OUTPATIENT
Start: 2021-12-17 | End: 2022-01-21

## 2021-12-17 RX ORDER — SIMETHICONE 20 MG/.3ML
200 EMULSION ORAL ONCE
Status: CANCELLED | OUTPATIENT
Start: 2021-12-17 | End: 2021-12-17

## 2021-12-22 ENCOUNTER — HOSPITAL ENCOUNTER (OUTPATIENT)
Dept: GASTROENTEROLOGY | Facility: HOSPITAL | Age: 71
Discharge: HOME | End: 2021-12-22
Attending: NEUROLOGICAL SURGERY | Admitting: NEUROLOGICAL SURGERY

## 2021-12-22 DIAGNOSIS — D64.9 ANEMIA, UNSPECIFIED TYPE: ICD-10-CM

## 2021-12-22 PROCEDURE — 63710000001 SIMETHICONE 40 MG/0.6ML SUSPENSION 30 ML BOTTLE: Performed by: NURSE PRACTITIONER

## 2021-12-22 PROCEDURE — A9270 NON-COVERED ITEM OR SERVICE: HCPCS | Performed by: NURSE PRACTITIONER

## 2021-12-22 RX ORDER — SIMETHICONE 20 MG/.3ML
200 EMULSION ORAL ONCE
Status: COMPLETED | OUTPATIENT
Start: 2021-12-22 | End: 2021-12-22

## 2021-12-22 RX ADMIN — SIMETHICONE 200 MG: 20 SUSPENSION/ DROPS ORAL at 07:32

## 2021-12-28 ENCOUNTER — TELEPHONE (OUTPATIENT)
Dept: GASTROENTEROLOGY | Facility: CLINIC | Age: 71
End: 2021-12-28

## 2021-12-28 NOTE — TELEPHONE ENCOUNTER
I tried to call Mr Wilson to give Pill Cam results. No answer; no voice mail set up. I will send result letter to patient's mychart and the mail.

## 2021-12-28 NOTE — TELEPHONE ENCOUNTER
----- Message from Lance Walton MD sent at 12/26/2021  1:29 PM EST -----  Regarding: Pill Cam  Please let patient know Pill Cam is normal.  No lesions or blood seen

## 2021-12-29 ENCOUNTER — TELEPHONE (OUTPATIENT)
Dept: GASTROENTEROLOGY | Facility: CLINIC | Age: 71
End: 2021-12-29

## 2021-12-29 ENCOUNTER — PATIENT MESSAGE (OUTPATIENT)
Dept: GASTROENTEROLOGY | Facility: CLINIC | Age: 71
End: 2021-12-29

## 2021-12-29 DIAGNOSIS — D64.9 ANEMIA, UNSPECIFIED TYPE: Primary | ICD-10-CM

## 2021-12-29 NOTE — TELEPHONE ENCOUNTER
From: MEGHAN BONNER  To: Pedro Wilson  Sent: 12/29/2021 9:10 AM EST  Subject: ENDOSCOPY GI CAPSULE RESULTS    Mr Wilson,    Your capsule is normal, no obvious sign of blood loss. We never got his occult stool results. Did you turn in your Occult stool specimen to the lab? Yasemin, is going to refer you to a hematologist to see if we can figure out why your showing signs of anemia. Thanks TIARA Guzman

## 2021-12-29 NOTE — TELEPHONE ENCOUNTER
I TRIED TO CALL MR HUERTA TO GIVE ENDOSCOPY GI CAPSULE RESULTS. NO ANSWER; PHONE LINE BUSY. UNABLE TO LEAVE MESSAGE.

## 2021-12-29 NOTE — TELEPHONE ENCOUNTER
----- Message from MITESH Lyle sent at 12/29/2021  7:58 AM EST -----  Can you let him know that his capsule is normal, no obvious sign of blood loss.  I never got his occult stool results.  Can you check on this and make sure he did them?  I am going to refer him to a hematologist to see if we can figure out why he is anemic.

## 2022-01-21 ENCOUNTER — LAB (OUTPATIENT)
Dept: LAB | Facility: HOSPITAL | Age: 72
End: 2022-01-21

## 2022-01-21 ENCOUNTER — CONSULT (OUTPATIENT)
Dept: ONCOLOGY | Facility: CLINIC | Age: 72
End: 2022-01-21

## 2022-01-21 VITALS
SYSTOLIC BLOOD PRESSURE: 121 MMHG | HEART RATE: 52 BPM | HEIGHT: 70 IN | WEIGHT: 180.1 LBS | DIASTOLIC BLOOD PRESSURE: 58 MMHG | RESPIRATION RATE: 16 BRPM | BODY MASS INDEX: 25.78 KG/M2 | OXYGEN SATURATION: 99 % | TEMPERATURE: 98 F

## 2022-01-21 DIAGNOSIS — Z13.6 ENCOUNTER FOR SCREENING FOR CARDIOVASCULAR DISORDERS: ICD-10-CM

## 2022-01-21 DIAGNOSIS — D64.9 ANEMIA, UNSPECIFIED TYPE: ICD-10-CM

## 2022-01-21 DIAGNOSIS — D64.9 ANEMIA, UNSPECIFIED TYPE: Primary | ICD-10-CM

## 2022-01-21 LAB
ALBUMIN SERPL-MCNC: 4.1 G/DL (ref 3.5–5.2)
ALBUMIN/GLOB SERPL: 1.1 G/DL
ALP SERPL-CCNC: 51 U/L (ref 39–117)
ALT SERPL W P-5'-P-CCNC: 18 U/L (ref 1–41)
ANION GAP SERPL CALCULATED.3IONS-SCNC: 9 MMOL/L (ref 5–15)
AST SERPL-CCNC: 17 U/L (ref 1–40)
BILIRUB CONJ SERPL-MCNC: <0.2 MG/DL (ref 0–0.3)
BILIRUB SERPL-MCNC: 0.4 MG/DL (ref 0–1.2)
BUN SERPL-MCNC: 12 MG/DL (ref 8–23)
BUN/CREAT SERPL: 13.6 (ref 7–25)
CALCIUM SPEC-SCNC: 9.4 MG/DL (ref 8.6–10.5)
CHLORIDE SERPL-SCNC: 104 MMOL/L (ref 98–107)
CHROMATIN AB SERPL-ACNC: <10 IU/ML (ref 0–14)
CO2 SERPL-SCNC: 25 MMOL/L (ref 22–29)
CREAT SERPL-MCNC: 0.88 MG/DL (ref 0.76–1.27)
CRP SERPL-MCNC: <0.3 MG/DL (ref 0–0.5)
DAT POLY-SP REAG RBC QL: NEGATIVE
ERYTHROCYTE [DISTWIDTH] IN BLOOD BY AUTOMATED COUNT: 14.7 % (ref 12.3–15.4)
ERYTHROCYTE [SEDIMENTATION RATE] IN BLOOD: 40 MM/HR (ref 0–20)
FERRITIN SERPL-MCNC: 341.3 NG/ML (ref 30–400)
FOLATE SERPL-MCNC: 18.3 NG/ML (ref 4.78–24.2)
GFR SERPL CREATININE-BSD FRML MDRD: 85 ML/MIN/1.73
GLOBULIN UR ELPH-MCNC: 3.6 GM/DL
GLUCOSE SERPL-MCNC: 114 MG/DL (ref 65–99)
HAPTOGLOB SERPL-MCNC: 144 MG/DL (ref 30–200)
HCT VFR BLD AUTO: 28.2 % (ref 37.5–51)
HCYS SERPL-MCNC: 11.2 UMOL/L (ref 0–15)
HGB BLD-MCNC: 9.3 G/DL (ref 13–17.7)
IRON 24H UR-MRATE: 68 MCG/DL (ref 59–158)
IRON SATN MFR SERPL: 22 % (ref 20–50)
LDH SERPL-CCNC: 156 U/L (ref 135–225)
LYMPHOCYTES # BLD AUTO: 1.9 10*3/MM3 (ref 0.7–3.1)
LYMPHOCYTES NFR BLD AUTO: 27.8 % (ref 19.6–45.3)
MCH RBC QN AUTO: 30.9 PG (ref 26.6–33)
MCHC RBC AUTO-ENTMCNC: 33 G/DL (ref 31.5–35.7)
MCV RBC AUTO: 93.5 FL (ref 79–97)
MONOCYTES # BLD AUTO: 0.5 10*3/MM3 (ref 0.1–0.9)
MONOCYTES NFR BLD AUTO: 7.6 % (ref 5–12)
NEUTROPHILS NFR BLD AUTO: 4.5 10*3/MM3 (ref 1.7–7)
NEUTROPHILS NFR BLD AUTO: 64.6 % (ref 42.7–76)
PLATELET # BLD AUTO: 325 10*3/MM3 (ref 140–450)
PMV BLD AUTO: 6.6 FL (ref 6–12)
POTASSIUM SERPL-SCNC: 4.3 MMOL/L (ref 3.5–5.2)
PROT SERPL-MCNC: 7.7 G/DL (ref 6–8.5)
RBC # BLD AUTO: 3.01 10*6/MM3 (ref 4.14–5.8)
RETICS # AUTO: 0.05 10*6/MM3 (ref 0.02–0.13)
RETICS/RBC NFR AUTO: 1.81 % (ref 0.7–1.9)
SODIUM SERPL-SCNC: 138 MMOL/L (ref 136–145)
TIBC SERPL-MCNC: 314 MCG/DL (ref 298–536)
TRANSFERRIN SERPL-MCNC: 211 MG/DL (ref 200–360)
VIT B12 BLD-MCNC: 337 PG/ML (ref 211–946)
WBC NRBC COR # BLD: 6.9 10*3/MM3 (ref 3.4–10.8)

## 2022-01-21 PROCEDURE — 83615 LACTATE (LD) (LDH) ENZYME: CPT

## 2022-01-21 PROCEDURE — 80053 COMPREHEN METABOLIC PANEL: CPT

## 2022-01-21 PROCEDURE — 85025 COMPLETE CBC W/AUTO DIFF WBC: CPT

## 2022-01-21 PROCEDURE — 84466 ASSAY OF TRANSFERRIN: CPT

## 2022-01-21 PROCEDURE — 86334 IMMUNOFIX E-PHORESIS SERUM: CPT

## 2022-01-21 PROCEDURE — 84165 PROTEIN E-PHORESIS SERUM: CPT

## 2022-01-21 PROCEDURE — 86880 COOMBS TEST DIRECT: CPT

## 2022-01-21 PROCEDURE — 83010 ASSAY OF HAPTOGLOBIN QUANT: CPT

## 2022-01-21 PROCEDURE — 85060 BLOOD SMEAR INTERPRETATION: CPT

## 2022-01-21 PROCEDURE — 82746 ASSAY OF FOLIC ACID SERUM: CPT

## 2022-01-21 PROCEDURE — 82955 ASSAY OF G6PD ENZYME: CPT

## 2022-01-21 PROCEDURE — 83070 ASSAY OF HEMOSIDERIN QUAL: CPT

## 2022-01-21 PROCEDURE — 83540 ASSAY OF IRON: CPT

## 2022-01-21 PROCEDURE — 86140 C-REACTIVE PROTEIN: CPT

## 2022-01-21 PROCEDURE — 82784 ASSAY IGA/IGD/IGG/IGM EACH: CPT

## 2022-01-21 PROCEDURE — 83921 ORGANIC ACID SINGLE QUANT: CPT

## 2022-01-21 PROCEDURE — 85041 AUTOMATED RBC COUNT: CPT

## 2022-01-21 PROCEDURE — 82248 BILIRUBIN DIRECT: CPT

## 2022-01-21 PROCEDURE — 86431 RHEUMATOID FACTOR QUANT: CPT

## 2022-01-21 PROCEDURE — 85652 RBC SED RATE AUTOMATED: CPT

## 2022-01-21 PROCEDURE — 83090 ASSAY OF HOMOCYSTEINE: CPT

## 2022-01-21 PROCEDURE — 82607 VITAMIN B-12: CPT

## 2022-01-21 PROCEDURE — 82728 ASSAY OF FERRITIN: CPT

## 2022-01-21 PROCEDURE — 82668 ASSAY OF ERYTHROPOIETIN: CPT

## 2022-01-21 PROCEDURE — 99204 OFFICE O/P NEW MOD 45 MIN: CPT | Performed by: INTERNAL MEDICINE

## 2022-01-21 PROCEDURE — 83051 HEMOGLOBIN PLASMA: CPT

## 2022-01-21 PROCEDURE — 85045 AUTOMATED RETICULOCYTE COUNT: CPT

## 2022-01-21 PROCEDURE — 83521 IG LIGHT CHAINS FREE EACH: CPT

## 2022-01-21 PROCEDURE — 36415 COLL VENOUS BLD VENIPUNCTURE: CPT

## 2022-01-21 NOTE — PROGRESS NOTES
"CHIEF COMPLAINT: Normochromic normocytic anemia    REASON FOR REFERRAL: Same      RECORDS OBTAINED  Records of the patients history including those obtained from [facility], [place] records were reviewed and summarized in detail.    Oncology/Hematology History Overview Note   1.  Normochromic normocytic anemia  2.  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  TIA with chart stating \"carotid artery occlusion\"  4.  Hyperlipidemia  5.  Hypertension  6.  Diabetes  7.  BPH  8.  Ascending aortic aneurysm according to the chart problem list  9.  History of bradycardia according to the problem list in the chart  10.  Fibrotic lung disease according to the problem list in the chart    Hematology history timeline:  -7/12/2021 hemoglobin 10.2 with normochromic normocytic indices and unremarkable CMP.  -11/24/2021 through 11/25/2021 inpatient hospitalization Dr. Pravin Pope.  Left heart cath planned but found to be anemic hemoglobin 7.7 with MCV 95 and normal white count and platelet count.  No differential.  BMP unremarkable.  Iron normal 63 with decreased transferrin 181 and decreased total iron binding capacity of 278 with iron saturation normal 23% and ferritin normal 237.2.  Folic acid normal 13.1.  B12 normal 346.  Hemoccult negative.  Reticulocyte count 1.17% EGD showed antral reactive changes with H. pylori negative.  Esophagus stomach and duodenum appeared normal.  Nonbleeding 12 mm duodenal diverticulum.  Per hospital records, the patient reportedly had upper endoscopy in [place] 1 year ago that showed evidence of chronic blood loss but details were not clear.  Last colonoscopy was in [place] 2-3 years ago unremarkable.  Denies change in the color caliber or consistency of his stools.  On aspirin.    -12/15/2021 hemoglobin up to 10.1 with unremarkable CBC.      -12/22/2021 capsule endoscopy normal.  Hematology referral made.    -1/21/2022 initial [facility] hematology consultation: With his longstanding history " of anemia according the patient, he will get his many of those records from [facility] as he can.  In the meantime I will do a hemolytic panel, megaloblastic panel, iron deficiency panel, and serum M panel but my suspicions for hemolysis, megaloblastic anemia, iron deficiency anemia, or plasma cell dyscrasia is fairly low with normal renal function and normal Red cell indices and Red cell distribution with.  I do suspect anemia of inflammation or less likely myelodysplasia.  The latter should also have elevated MCV but we may need to do a bone marrow biopsy if this first set of test is unrevealing.  In addition, though I do not palpate any hepatosplenomegaly we may do liver spleen ultrasound to look for sequestration but normally that would lower the white count and platelet count as well.     Anemia       HISTORY OF PRESENT ILLNESS:  The patient is a 71 y.o.  male, referred for normochromic normocytic anemia.  Hemoglobin in the sevens with a history according to the patient of anemia dating back decades.  He does not have that data from [facility] and does not know how low his counts are in the past but he has never been transfused until the time of his heart cath and has felt good since his transfusion at the time of his heart cath.    REVIEW OF SYSTEMS:  No change in the color caliber or consistency of the stools.  No jaundice icterus or rash.  Feeling stronger after transfusion.    Past Medical History:   Diagnosis Date   • Back pain    • BPH (benign prostatic hyperplasia)    • Carotid artery occlusion    • Coronary artery disease    • Diabetes mellitus (HCC)    • Gastric bleeding    • GERD (gastroesophageal reflux disease)    • Herniated lumbar intervertebral disc    • Hyperlipidemia    • Hypertension    • Old MI (myocardial infarction)    • TIA (transient ischemic attack)      Past Surgical History:   Procedure Laterality Date   • CARDIAC CATHETERIZATION     • COLONOSCOPY     • CORONARY ARTERY BYPASS  GRAFT     • ENDOSCOPY N/A 11/24/2021    Procedure: ESOPHAGOGASTRODUODENOSCOPY;  Surgeon: Lance Walton MD;  Location: [facility] ENDOSCOPY;  Service: Gastroenterology;  Laterality: N/A;   • HERNIA REPAIR         Current Outpatient Medications on File Prior to Visit   Medication Sig Dispense Refill   • amLODIPine (NORVASC) 5 MG tablet Take 5 mg by mouth Daily.     • aspirin 81 MG EC tablet Take 1 tablet by mouth Daily. 30 tablet 5   • atorvastatin (LIPITOR) 40 MG tablet Take 1 tablet by mouth Every Night.     • clopidogrel (Plavix) 75 MG tablet Take 1 tablet by mouth Daily.     • famotidine (PEPCID) 20 MG tablet      • ferrous sulfate 325 (65 FE) MG tablet Take 1 tablet by mouth Daily.     • gabapentin (NEURONTIN) 800 MG tablet      • glipizide (GLUCOTROL) 10 MG tablet Take 10 mg by mouth Daily.     • hydroCHLOROthiazide (HYDRODIURIL) 25 MG tablet Take 1 tablet by mouth Daily.     • HYDROcodone-acetaminophen (NORCO)  MG per tablet Take 1 tablet by mouth 4 (Four) Times a Day As Needed for Moderate Pain .     • isosorbide mononitrate (IMDUR) 120 MG 24 hr tablet      • losartan (COZAAR) 100 MG tablet Take 1 tablet by mouth Daily.     • metFORMIN ER (GLUCOPHAGE-XR) 500 MG 24 hr tablet Take 1,000 mg by mouth 2 (Two) Times a Day.     • metoprolol succinate XL (TOPROL-XL) 25 MG 24 hr tablet Take 1 tablet by mouth Daily.     • pantoprazole (PROTONIX) 40 MG EC tablet Take 40 mg by mouth 2 (Two) Times a Day.     • ranolazine (RANEXA) 500 MG 12 hr tablet Take 500 mg by mouth 2 (Two) Times a Day.     • tamsulosin (FLOMAX) 0.4 MG capsule 24 hr capsule Take 0.4 mg by mouth Daily.     • zolpidem (AMBIEN) 10 MG tablet Take 1 tablet by mouth At Night As Needed for Sleep.     • [DISCONTINUED] escitalopram (LEXAPRO) 10 MG tablet      • [DISCONTINUED] PEG-KCl-NaCl-NaSulf-Na Asc-C (MOVIPREP) 100 g reconstituted solution powder Starting at 5am on morning of procedure, drink 1/4 liter (250ml) every 15 minutes so full liter is consumed  "in 1 hour. 1000 mL 0     No current facility-administered medications on file prior to visit.       No Known Allergies    Social History     Socioeconomic History   • Marital status:    Tobacco Use   • Smoking status: Never Smoker   • Smokeless tobacco: Never Used   Vaping Use   • Vaping Use: Never used   Substance and Sexual Activity   • Alcohol use: Never   • Drug use: Never   • Sexual activity: Defer       Family History   Problem Relation Age of Onset   • Heart disease Mother    • Diabetes Mother    • Heart disease Father    • Breast cancer Sister    • No Known Problems Brother        PHYSICAL EXAM:  No jaundice or icterus.  No palpable hepatosplenomegaly.  No rashes.  Lungs are clear.    /58   Pulse 52   Temp 98 °F (36.7 °C) (Temporal)   Resp 16   Ht 177.8 cm (70\")   Wt 81.7 kg (180 lb 1.6 oz)   SpO2 99%   BMI 25.84 kg/m²     ECOG score: 0           Lab Results   Component Value Date    HGB 10.7 (L) 12/15/2021    HCT 32.6 (L) 12/15/2021    MCV 93.1 12/15/2021     12/15/2021    WBC 7.99 12/15/2021    NEUTROABS 4.77 12/15/2021    LYMPHSABS 2.04 12/15/2021    MONOSABS 0.60 12/15/2021    EOSABS 0.50 (H) 12/15/2021    BASOSABS 0.05 12/15/2021     Lab Results   Component Value Date    GLUCOSE 143 (H) 11/24/2021    BUN 15 11/24/2021    CREATININE 0.83 11/24/2021     11/24/2021    K 3.5 11/24/2021     (H) 11/24/2021    CO2 24.0 11/24/2021    CALCIUM 8.7 11/24/2021         Assessment/Plan     1.  Normochromic normocytic anemia  2.  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  TIA with chart stating \"carotid artery occlusion\"  4.  Hyperlipidemia  5.  Hypertension  6.  Diabetes  7.  BPH  8.  Ascending aortic aneurysm according to the chart problem list  9.  History of bradycardia according to the problem list in the chart  10.  Fibrotic lung disease according to the problem list in the chart    Hematology history timeline:  -7/12/2021 hemoglobin 10.2 with normochromic " normocytic indices and unremarkable CMP.  -11/24/2021 through 11/25/2021 inpatient hospitalization Dr. Pravin Pope.  Left heart cath planned but found to be anemic hemoglobin 7.7 with MCV 95 and normal white count and platelet count.  No differential.  BMP unremarkable.  Iron normal 63 with decreased transferrin 181 and decreased total iron binding capacity of 278 with iron saturation normal 23% and ferritin normal 237.2.  Folic acid normal 13.1.  B12 normal 346.  Hemoccult negative.  Reticulocyte count 1.17% EGD showed antral reactive changes with H. pylori negative.  Esophagus stomach and duodenum appeared normal.  Nonbleeding 12 mm duodenal diverticulum.  Per hospital records, the patient reportedly had upper endoscopy in [place] 1 year ago that showed evidence of chronic blood loss but details were not clear.  Last colonoscopy was in [place] 2-3 years ago unremarkable.  Denies change in the color caliber or consistency of his stools.  On aspirin.    -12/15/2021 hemoglobin up to 10.1 with unremarkable CBC.      -12/22/2021 capsule endoscopy normal.  Hematology referral made.    -1/21/2022 initial [facility] hematology consultation: With his longstanding history of anemia according the patient, he will get his many of those records from [facility] as he can.  In the meantime I will do a hemolytic panel, megaloblastic panel, iron deficiency panel, and serum M panel but my suspicions for hemolysis, megaloblastic anemia, iron deficiency anemia, or plasma cell dyscrasia is fairly low with normal renal function and normal Red cell indices and Red cell distribution with.  I do suspect anemia of inflammation or less likely myelodysplasia.  The latter should also have elevated MCV but we may need to do a bone marrow biopsy if this first set of test is unrevealing.  In addition, though I do not palpate any hepatosplenomegaly we may do liver spleen ultrasound to look for sequestration but normally that would lower  the white count and platelet count as well.      Total time of care today inclusive of time spent today prior to his arrival reviewing notes of cardiology and labs during his hospitalization and subsequent labs as outlined above and taking history and physical today and going over the broad differential diagnosis of anemia and work-up thereof and after visit putting forth the plan as outlined took 45 minutes of patient care time throughout the day today.  Drew Cantu MD    1/21/2022

## 2022-01-22 LAB
CYTOLOGIST CVX/VAG CYTO: NORMAL
PATH INTERP BLD-IMP: NORMAL

## 2022-01-24 LAB
ALBUMIN SERPL ELPH-MCNC: 3.6 G/DL (ref 2.9–4.4)
ALBUMIN/GLOB SERPL: 1 {RATIO} (ref 0.7–1.7)
ALPHA1 GLOB SERPL ELPH-MCNC: 0.2 G/DL (ref 0–0.4)
ALPHA2 GLOB SERPL ELPH-MCNC: 0.8 G/DL (ref 0.4–1)
B-GLOBULIN SERPL ELPH-MCNC: 1.1 G/DL (ref 0.7–1.3)
EPO SERPL-ACNC: 15.6 MIU/ML (ref 2.6–18.5)
GAMMA GLOB SERPL ELPH-MCNC: 1.7 G/DL (ref 0.4–1.8)
GLOBULIN SER-MCNC: 3.8 G/DL (ref 2.2–3.9)
HEMOSIDERIN UR QL: NEGATIVE
IGA SERPL-MCNC: 456 MG/DL (ref 61–437)
IGG SERPL-MCNC: 1873 MG/DL (ref 603–1613)
IGM SERPL-MCNC: 46 MG/DL (ref 15–143)
INTERPRETATION SERPL IEP-IMP: ABNORMAL
KAPPA LC FREE SER-MCNC: 64 MG/L (ref 3.3–19.4)
KAPPA LC FREE/LAMBDA FREE SER: 1.48 {RATIO} (ref 0.26–1.65)
LABORATORY COMMENT REPORT: ABNORMAL
LAMBDA LC FREE SERPL-MCNC: 43.2 MG/L (ref 5.7–26.3)
M PROTEIN SERPL ELPH-MCNC: ABNORMAL G/DL
PROT SERPL-MCNC: 7.4 G/DL (ref 6–8.5)

## 2022-01-25 LAB
G6PD BLD QN: 295 U/10E12 RBC (ref 127–427)
RBC # BLD AUTO: 3.05 X10E6/UL (ref 4.14–5.8)

## 2022-01-26 LAB — HGB FREE PLAS-MCNC: 4.8 MG/DL (ref 0–4.9)

## 2022-01-28 LAB — METHYLMALONATE SERPL-SCNC: 290 NMOL/L (ref 0–378)

## 2022-02-18 ENCOUNTER — OFFICE VISIT (OUTPATIENT)
Dept: ONCOLOGY | Facility: CLINIC | Age: 72
End: 2022-02-18

## 2022-02-18 ENCOUNTER — TELEPHONE (OUTPATIENT)
Dept: ONCOLOGY | Facility: CLINIC | Age: 72
End: 2022-02-18

## 2022-02-18 VITALS
RESPIRATION RATE: 16 BRPM | SYSTOLIC BLOOD PRESSURE: 136 MMHG | HEIGHT: 70 IN | HEART RATE: 65 BPM | OXYGEN SATURATION: 98 % | DIASTOLIC BLOOD PRESSURE: 76 MMHG | WEIGHT: 179 LBS | BODY MASS INDEX: 25.62 KG/M2 | TEMPERATURE: 96.2 F

## 2022-02-18 DIAGNOSIS — R09.89 OTHER SPECIFIED SYMPTOMS AND SIGNS INVOLVING THE CIRCULATORY AND RESPIRATORY SYSTEMS: ICD-10-CM

## 2022-02-18 DIAGNOSIS — D64.9 ANEMIA, UNSPECIFIED TYPE: Primary | Chronic | ICD-10-CM

## 2022-02-18 PROCEDURE — 99215 OFFICE O/P EST HI 40 MIN: CPT | Performed by: INTERNAL MEDICINE

## 2022-02-18 RX ORDER — SUCRALFATE 1 G/1
TABLET ORAL
COMMUNITY
Start: 2022-01-22

## 2022-02-18 RX ORDER — AMOXICILLIN AND CLAVULANATE POTASSIUM 562.5; 437.5; 62.5 MG/1; MG/1; MG/1
2 TABLET, FILM COATED, EXTENDED RELEASE ORAL 2 TIMES DAILY
COMMUNITY
Start: 2022-02-04

## 2022-02-18 RX ORDER — NITROGLYCERIN 0.4 MG/1
0.4 TABLET SUBLINGUAL
COMMUNITY
Start: 2022-02-03

## 2022-02-18 NOTE — TELEPHONE ENCOUNTER
Dr. Cantu confirmed with Dr. Brar that patient does not need to hold ASA or plavix prior to BM biopsy.

## 2022-02-18 NOTE — PROGRESS NOTES
"CHIEF COMPLAINT: Chronic mild normochromic normocytic anemia    Problem List:  Oncology/Hematology History Overview Note   1.  Normochromic normocytic anemia  2.  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  TIA with chart stating \"carotid artery occlusion\"  4.  Hyperlipidemia  5.  Hypertension  6.  Diabetes  7.  BPH  8.  Ascending aortic aneurysm according to the chart problem list  9.  History of bradycardia according to the problem list in the chart  10.  Fibrotic lung disease according to the problem list in the chart    Hematology history timeline:  -1/3/2020 hemoglobin 11.2 MCV 92.4 otherwise normal CBC and differential  -10/5/2020 hemoglobin 10.7 MCV 91.9 otherwise unremarkable CBC and differential.  -11/9/2020 hemoglobin 10.3 MCV 92.2 otherwise unremarkable CBC and differential.  -3/1/2021 hemoglobin 9.3 with MCV 93.7 otherwise unremarkable CBC and differential.  -7/12/2021 hemoglobin 10.2 with normochromic normocytic indices and unremarkable CMP.  -11/24/2021 through 11/25/2021 inpatient hospitalization Dr. Pravin Pope.  Left heart cath planned but found to be anemic hemoglobin 7.7 with MCV 95 and normal white count and platelet count.  No differential.  BMP unremarkable.  Iron normal 63 with decreased transferrin 181 and decreased total iron binding capacity of 278 with iron saturation normal 23% and ferritin normal 237.2.  Folic acid normal 13.1.  B12 normal 346.  Hemoccult negative.  Reticulocyte count 1.17% EGD showed antral reactive changes with H. pylori negative.  Esophagus stomach and duodenum appeared normal.  Nonbleeding 12 mm duodenal diverticulum.  Per hospital records, the patient reportedly had upper endoscopy in [place] 1 year ago that showed evidence of chronic blood loss but details were not clear.  Last colonoscopy was in [place] 2-3 years ago unremarkable.  Denies change in the color caliber or consistency of his stools.  On aspirin.    -12/15/2021 hemoglobin up to 10.1 with " unremarkable CBC.      -12/22/2021 capsule endoscopy normal.  Hematology referral made.    -1/21/2022 initial [facility] hematology consultation: With his longstanding history of anemia according the patient, he will get his many of those records from [facility] as he can.  In the meantime I will do a hemolytic panel, megaloblastic panel, iron deficiency panel, and serum M panel but my suspicions for hemolysis, megaloblastic anemia, iron deficiency anemia, or plasma cell dyscrasia is fairly low with normal renal function and normal Red cell indices and Red cell distribution with.  I do suspect anemia of inflammation or less likely myelodysplasia.  The latter should also have elevated MCV but we may need to do a bone marrow biopsy if this first set of test is unrevealing.  In addition, though I do not palpate any hepatosplenomegaly we may do liver spleen ultrasound to look for sequestration but normally that would lower the white count and platelet count as well.    -1/21/2022 data:  Hemoglobin 9.3 with MCV normal 93.5 and white count 6900 with normal platelets 325,000 and unremarkable differential.  CMP unremarkable save for glucose 114.  Normal bilirubin.  Normal liver enzymes and total protein and globulin.  Serum M panel normal save for sedimentation rate: Total IgG high at 1873 with IgA high at 456 upper limit 437.  No monoclonality.  Balanced elevation of kappa light chain 64 and lambda 43 with normal ratio 1.48.  Sedimentation rate elevated at 40 with C-reactive protein less than 0.3.  Megaloblastic panel negative: B12 normal 337 with methylmalonic acid normal 290.  Folate normal 18.3 with homocystine normal 11.2.  Hemolytic panel negative: Haptoglobin normal 144 with reticulocyte and adequately normal at 1.81%.  [place] negative.  Erythropoietin inappropriately normal at 15.6.    -2/18/2022 [facility] hematology follow-up visit: I reviewed and injured the data he brought dating back from January 2020 through March  2021.  I reviewed the above data that I drew on 1/21/2022 with him.  Other than for mild elevation of sedimentation rate, his serum M panel was negative.  Erythropoietin level is inappropriately normal suggesting that his marrow is not responding as does his normal retake count which should be elevated should he be losing blood or hemolyzing.  No evidence of hemolysis or megaloblastic process.  Hence no discernible reversible processes found on peripheral blood testing.  We will get his liver spleen ultrasound though serial CMP over the last 2 years has been normal and we will check a bone marrow biopsy to rule out myelodysplastic process.  Prior iron indices suggest normal iron.     Anemia       HISTORY OF PRESENT ILLNESS:  The patient is a 71 y.o. male, here for follow up on management of chronic mild normocytic normochromic anemia without GI blood loss    Past Medical History:   Diagnosis Date   • Back pain    • BPH (benign prostatic hyperplasia)    • Carotid artery occlusion    • Coronary artery disease    • Diabetes mellitus (HCC)    • Gastric bleeding    • GERD (gastroesophageal reflux disease)    • Herniated lumbar intervertebral disc    • Hyperlipidemia    • Hypertension    • Old MI (myocardial infarction)    • TIA (transient ischemic attack)      Past Surgical History:   Procedure Laterality Date   • CARDIAC CATHETERIZATION     • COLONOSCOPY     • CORONARY ARTERY BYPASS GRAFT     • ENDOSCOPY N/A 11/24/2021    Procedure: ESOPHAGOGASTRODUODENOSCOPY;  Surgeon: Lance Walton MD;  Location: [facility] ENDOSCOPY;  Service: Gastroenterology;  Laterality: N/A;   • HERNIA REPAIR         No Known Allergies    Family History and Social History reviewed and changed as necessary    REVIEW OF SYSTEM:   No new somatic complaint    PHYSICAL EXAM:  No jaundice or icterus.  No palpable hepatosplenomegaly    Vitals:    02/18/22 0942   BP: 136/76   Pulse: 65   Resp: 16   Temp: 96.2 °F (35.7 °C)   TempSrc: Temporal   SpO2: 98%  "  Weight: 81.2 kg (179 lb)   Height: 177.8 cm (70\")     Vitals:    02/18/22 0942   PainSc: 0-No pain          ECOG score: 0           Vitals reviewed.    ECOG: (0) Fully Active - Able to Carry On All Pre-disease Performance Without Restriction    Lab Results   Component Value Date    HGB 9.3 (L) 01/21/2022    HCT 28.2 (L) 01/21/2022    MCV 93.5 01/21/2022     01/21/2022    WBC 6.90 01/21/2022    NEUTROABS 4.50 01/21/2022    LYMPHSABS 1.90 01/21/2022    MONOSABS 0.50 01/21/2022    EOSABS 0.50 (H) 12/15/2021    BASOSABS 0.05 12/15/2021       Lab Results   Component Value Date    GLUCOSE 114 (H) 01/21/2022    BUN 12 01/21/2022    CREATININE 0.88 01/21/2022     01/21/2022    K 4.3 01/21/2022     01/21/2022    CO2 25.0 01/21/2022    CALCIUM 9.4 01/21/2022    PROTEINTOT 7.7 01/21/2022    ALBUMIN 4.10 01/21/2022    ALBUMIN 3.6 01/21/2022    BILITOT 0.4 01/21/2022    ALKPHOS 51 01/21/2022    AST 17 01/21/2022    ALT 18 01/21/2022             ASSESSMENT & PLAN:  1.  Normochromic normocytic anemia  2.  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  TIA with chart stating \"carotid artery occlusion\"  4.  Hyperlipidemia  5.  Hypertension  6.  Diabetes  7.  BPH  8.  Ascending aortic aneurysm according to the chart problem list  9.  History of bradycardia according to the problem list in the chart  10.  Fibrotic lung disease according to the problem list in the chart    Hematology history timeline:  -1/3/2020 hemoglobin 11.2 MCV 92.4 otherwise normal CBC and differential  -10/5/2020 hemoglobin 10.7 MCV 91.9 otherwise unremarkable CBC and differential.  -11/9/2020 hemoglobin 10.3 MCV 92.2 otherwise unremarkable CBC and differential.  -3/1/2021 hemoglobin 9.3 with MCV 93.7 otherwise unremarkable CBC and differential.  -7/12/2021 hemoglobin 10.2 with normochromic normocytic indices and unremarkable CMP.  -11/24/2021 through 11/25/2021 inpatient hospitalization Dr. Pravin Pope.  Left heart cath planned but " found to be anemic hemoglobin 7.7 with MCV 95 and normal white count and platelet count.  No differential.  BMP unremarkable.  Iron normal 63 with decreased transferrin 181 and decreased total iron binding capacity of 278 with iron saturation normal 23% and ferritin normal 237.2.  Folic acid normal 13.1.  B12 normal 346.  Hemoccult negative.  Reticulocyte count 1.17% EGD showed antral reactive changes with H. pylori negative.  Esophagus stomach and duodenum appeared normal.  Nonbleeding 12 mm duodenal diverticulum.  Per hospital records, the patient reportedly had upper endoscopy in [place] 1 year ago that showed evidence of chronic blood loss but details were not clear.  Last colonoscopy was in [place] 2-3 years ago unremarkable.  Denies change in the color caliber or consistency of his stools.  On aspirin.    -12/15/2021 hemoglobin up to 10.1 with unremarkable CBC.      -12/22/2021 capsule endoscopy normal.  Hematology referral made.    -1/21/2022 initial [facility] hematology consultation: With his longstanding history of anemia according the patient, he will get his many of those records from [facility] as he can.  In the meantime I will do a hemolytic panel, megaloblastic panel, iron deficiency panel, and serum M panel but my suspicions for hemolysis, megaloblastic anemia, iron deficiency anemia, or plasma cell dyscrasia is fairly low with normal renal function and normal Red cell indices and Red cell distribution with.  I do suspect anemia of inflammation or less likely myelodysplasia.  The latter should also have elevated MCV but we may need to do a bone marrow biopsy if this first set of test is unrevealing.  In addition, though I do not palpate any hepatosplenomegaly we may do liver spleen ultrasound to look for sequestration but normally that would lower the white count and platelet count as well.    -1/21/2022 data:  Hemoglobin 9.3 with MCV normal 93.5 and white count 6900 with normal platelets  325,000 and unremarkable differential.  CMP unremarkable save for glucose 114.  Normal bilirubin.  Normal liver enzymes and total protein and globulin.  Serum M panel normal save for sedimentation rate: Total IgG high at 1873 with IgA high at 456 upper limit 437.  No monoclonality.  Balanced elevation of kappa light chain 64 and lambda 43 with normal ratio 1.48.  Sedimentation rate elevated at 40 with C-reactive protein less than 0.3.  Megaloblastic panel negative: B12 normal 337 with methylmalonic acid normal 290.  Folate normal 18.3 with homocystine normal 11.2.  Hemolytic panel negative: Haptoglobin normal 144 with reticulocyte and adequately normal at 1.81%.  [place] negative.  Erythropoietin inappropriately normal at 15.6.    -2/18/2022 [facility] hematology follow-up visit: I reviewed and injured the data he brought dating back from January 2020 through March 2021.  I reviewed the above data that I drew on 1/21/2022 with him.  Other than for mild elevation of sedimentation rate, his serum M panel was negative.  Erythropoietin level is inappropriately normal suggesting that his marrow is not responding as does his normal retake count which should be elevated should he be losing blood or hemolyzing.  No evidence of hemolysis or megaloblastic process.  Hence no discernible reversible processes found on peripheral blood testing.  We will get his liver spleen ultrasound though serial CMP over the last 2 years has been normal and we will check a bone marrow biopsy to rule out myelodysplastic process.  Prior iron indices suggest normal iron.    Total time of care today inclusive of time spent today prior to his arrival reviewing interval data as outlined above and during visit translating this information to him and after visit putting forth the plan as outlined above took 45 minutes of patient care time throughout the day today.  Drew Cantu MD    02/18/2022

## 2022-02-24 ENCOUNTER — HOSPITAL ENCOUNTER (OUTPATIENT)
Dept: CT IMAGING | Facility: HOSPITAL | Age: 72
Discharge: HOME | End: 2022-02-24

## 2022-02-24 ENCOUNTER — HOSPITAL ENCOUNTER (OUTPATIENT)
Dept: CARDIOLOGY | Facility: HOSPITAL | Age: 72
Discharge: HOME | End: 2022-02-24

## 2022-02-24 VITALS
HEART RATE: 53 BPM | WEIGHT: 178.6 LBS | HEIGHT: 70 IN | RESPIRATION RATE: 16 BRPM | DIASTOLIC BLOOD PRESSURE: 71 MMHG | OXYGEN SATURATION: 99 % | BODY MASS INDEX: 25.57 KG/M2 | TEMPERATURE: 97.3 F | SYSTOLIC BLOOD PRESSURE: 135 MMHG

## 2022-02-24 DIAGNOSIS — D64.9 ANEMIA, UNSPECIFIED TYPE: ICD-10-CM

## 2022-02-24 DIAGNOSIS — R09.89 OTHER SPECIFIED SYMPTOMS AND SIGNS INVOLVING THE CIRCULATORY AND RESPIRATORY SYSTEMS: ICD-10-CM

## 2022-02-24 LAB
ANION GAP SERPL CALCULATED.3IONS-SCNC: 9 MMOL/L (ref 5–15)
BASOPHILS # BLD AUTO: 0.04 10*3/MM3 (ref 0–0.2)
BASOPHILS NFR BLD AUTO: 0.6 % (ref 0–1.5)
BUN SERPL-MCNC: 17 MG/DL (ref 8–23)
BUN/CREAT SERPL: 20 (ref 7–25)
CALCIUM SPEC-SCNC: 8.8 MG/DL (ref 8.6–10.5)
CHLORIDE SERPL-SCNC: 107 MMOL/L (ref 98–107)
CO2 SERPL-SCNC: 22 MMOL/L (ref 22–29)
CREAT SERPL-MCNC: 0.85 MG/DL (ref 0.76–1.27)
DEPRECATED RDW RBC AUTO: 50.3 FL (ref 37–54)
EOSINOPHIL # BLD AUTO: 0.58 10*3/MM3 (ref 0–0.4)
EOSINOPHIL NFR BLD AUTO: 8.4 % (ref 0.3–6.2)
ERYTHROCYTE [DISTWIDTH] IN BLOOD BY AUTOMATED COUNT: 13.9 % (ref 12.3–15.4)
GFR SERPL CREATININE-BSD FRML MDRD: 89 ML/MIN/1.73
GLUCOSE SERPL-MCNC: 147 MG/DL (ref 65–99)
HCT VFR BLD AUTO: 28.6 % (ref 37.5–51)
HGB BLD-MCNC: 9.1 G/DL (ref 13–17.7)
IMM GRANULOCYTES # BLD AUTO: 0.02 10*3/MM3 (ref 0–0.05)
IMM GRANULOCYTES NFR BLD AUTO: 0.3 % (ref 0–0.5)
INR PPP: 1.09 (ref 0.85–1.16)
LYMPHOCYTES # BLD AUTO: 2.05 10*3/MM3 (ref 0.7–3.1)
LYMPHOCYTES NFR BLD AUTO: 29.6 % (ref 19.6–45.3)
MCH RBC QN AUTO: 31.4 PG (ref 26.6–33)
MCHC RBC AUTO-ENTMCNC: 31.8 G/DL (ref 31.5–35.7)
MCV RBC AUTO: 98.6 FL (ref 79–97)
MONOCYTES # BLD AUTO: 0.52 10*3/MM3 (ref 0.1–0.9)
MONOCYTES NFR BLD AUTO: 7.5 % (ref 5–12)
NEUTROPHILS NFR BLD AUTO: 3.71 10*3/MM3 (ref 1.7–7)
NEUTROPHILS NFR BLD AUTO: 53.6 % (ref 42.7–76)
NRBC BLD AUTO-RTO: 0 /100 WBC (ref 0–0.2)
PLATELET # BLD AUTO: 285 10*3/MM3 (ref 140–450)
PMV BLD AUTO: 9.1 FL (ref 6–12)
POTASSIUM SERPL-SCNC: 4.5 MMOL/L (ref 3.5–5.2)
PROTHROMBIN TIME: 13.8 SECONDS (ref 11.4–14.4)
RBC # BLD AUTO: 2.9 10*6/MM3 (ref 4.14–5.8)
SODIUM SERPL-SCNC: 138 MMOL/L (ref 136–145)
WBC NRBC COR # BLD: 6.92 10*3/MM3 (ref 3.4–10.8)

## 2022-02-24 PROCEDURE — 85610 PROTHROMBIN TIME: CPT | Performed by: RADIOLOGY

## 2022-02-24 PROCEDURE — 88342 IMHCHEM/IMCYTCHM 1ST ANTB: CPT | Performed by: INTERNAL MEDICINE

## 2022-02-24 PROCEDURE — 88305 TISSUE EXAM BY PATHOLOGIST: CPT | Performed by: INTERNAL MEDICINE

## 2022-02-24 PROCEDURE — 25010000002 MIDAZOLAM PER 1 MG: Performed by: RADIOLOGY

## 2022-02-24 PROCEDURE — 0 LIDOCAINE 1 % SOLUTION: Performed by: RADIOLOGY

## 2022-02-24 PROCEDURE — 25010000002 FENTANYL CITRATE (PF) 50 MCG/ML SOLUTION: Performed by: RADIOLOGY

## 2022-02-24 PROCEDURE — 77012 CT SCAN FOR NEEDLE BIOPSY: CPT

## 2022-02-24 PROCEDURE — 88313 SPECIAL STAINS GROUP 2: CPT | Performed by: INTERNAL MEDICINE

## 2022-02-24 PROCEDURE — 88311 DECALCIFY TISSUE: CPT | Performed by: INTERNAL MEDICINE

## 2022-02-24 PROCEDURE — 85025 COMPLETE CBC W/AUTO DIFF WBC: CPT | Performed by: RADIOLOGY

## 2022-02-24 PROCEDURE — 99152 MOD SED SAME PHYS/QHP 5/>YRS: CPT

## 2022-02-24 PROCEDURE — 93975 VASCULAR STUDY: CPT

## 2022-02-24 PROCEDURE — 88341 IMHCHEM/IMCYTCHM EA ADD ANTB: CPT | Performed by: INTERNAL MEDICINE

## 2022-02-24 PROCEDURE — 80048 BASIC METABOLIC PNL TOTAL CA: CPT | Performed by: RADIOLOGY

## 2022-02-24 RX ORDER — MIDAZOLAM HYDROCHLORIDE 1 MG/ML
INJECTION INTRAMUSCULAR; INTRAVENOUS
Status: DISPENSED
Start: 2022-02-24 | End: 2022-02-24

## 2022-02-24 RX ORDER — HYDROCODONE BITARTRATE AND ACETAMINOPHEN 5; 325 MG/1; MG/1
1 TABLET ORAL EVERY 4 HOURS PRN
Status: CANCELLED | OUTPATIENT
Start: 2022-02-24 | End: 2022-03-03

## 2022-02-24 RX ORDER — SODIUM CHLORIDE 0.9 % (FLUSH) 0.9 %
10 SYRINGE (ML) INJECTION AS NEEDED
Status: DISCONTINUED | OUTPATIENT
Start: 2022-02-24 | End: 2022-02-25 | Stop reason: HOSPADM

## 2022-02-24 RX ORDER — MIDAZOLAM HYDROCHLORIDE 1 MG/ML
INJECTION INTRAMUSCULAR; INTRAVENOUS
Status: COMPLETED | OUTPATIENT
Start: 2022-02-24 | End: 2022-02-24

## 2022-02-24 RX ORDER — SODIUM BICARBONATE 42 MG/ML
INJECTION, SOLUTION INTRAVENOUS
Status: COMPLETED
Start: 2022-02-24 | End: 2022-02-24

## 2022-02-24 RX ORDER — SODIUM BICARBONATE 42 MG/ML
2.5 INJECTION, SOLUTION INTRAVENOUS ONCE
Status: COMPLETED | OUTPATIENT
Start: 2022-02-24 | End: 2022-02-24

## 2022-02-24 RX ORDER — FENTANYL CITRATE 50 UG/ML
INJECTION, SOLUTION INTRAMUSCULAR; INTRAVENOUS
Status: DISPENSED
Start: 2022-02-24 | End: 2022-02-24

## 2022-02-24 RX ORDER — LIDOCAINE HYDROCHLORIDE 10 MG/ML
8 INJECTION, SOLUTION INFILTRATION; PERINEURAL ONCE
Status: COMPLETED | OUTPATIENT
Start: 2022-02-24 | End: 2022-02-24

## 2022-02-24 RX ORDER — SODIUM CHLORIDE 0.9 % (FLUSH) 0.9 %
3 SYRINGE (ML) INJECTION EVERY 12 HOURS SCHEDULED
Status: DISCONTINUED | OUTPATIENT
Start: 2022-02-24 | End: 2022-02-25 | Stop reason: HOSPADM

## 2022-02-24 RX ORDER — FENTANYL CITRATE 50 UG/ML
INJECTION, SOLUTION INTRAMUSCULAR; INTRAVENOUS
Status: COMPLETED | OUTPATIENT
Start: 2022-02-24 | End: 2022-02-24

## 2022-02-24 RX ADMIN — MIDAZOLAM HYDROCHLORIDE 1 MG: 1 INJECTION, SOLUTION INTRAMUSCULAR; INTRAVENOUS at 09:17

## 2022-02-24 RX ADMIN — LIDOCAINE HYDROCHLORIDE 8 ML: 10 INJECTION, SOLUTION INFILTRATION; PERINEURAL at 09:28

## 2022-02-24 RX ADMIN — MIDAZOLAM HYDROCHLORIDE 1 MG: 1 INJECTION, SOLUTION INTRAMUSCULAR; INTRAVENOUS at 09:08

## 2022-02-24 RX ADMIN — SODIUM BICARBONATE 2.5 MEQ: 42 INJECTION, SOLUTION INTRAVENOUS at 09:29

## 2022-02-24 RX ADMIN — FENTANYL CITRATE 50 MCG: 50 INJECTION, SOLUTION INTRAMUSCULAR; INTRAVENOUS at 09:17

## 2022-02-24 RX ADMIN — FENTANYL CITRATE 50 MCG: 50 INJECTION, SOLUTION INTRAMUSCULAR; INTRAVENOUS at 09:08

## 2022-02-24 NOTE — PRE-PROCEDURE NOTE
[facility]   Vascular Interventional Radiology  History & Physicial    Patient Name:Pedro Wilson    : 1950    MRN: [id number]    Primary Care Physician: System, Provider Not In    Referring Physician: Drew Cantu MD     Date of admission: 2022    Subjective     Reason for Consult: Bone marrow biopsy    History of Present Illness     Pedro Wilson is a 71 y.o. male referred to IR as noted above.      Active Hospital Problems:  There are no active hospital problems to display for this patient.      Personal History     Past Medical History:   Diagnosis Date   • Back pain    • BPH (benign prostatic hyperplasia)    • Carotid artery occlusion    • Coronary artery disease    • Diabetes mellitus (HCC)    • Gastric bleeding    • GERD (gastroesophageal reflux disease)    • Herniated lumbar intervertebral disc    • Hyperlipidemia    • Hypertension    • Old MI (myocardial infarction)    • TIA (transient ischemic attack)        Past Surgical History:   Procedure Laterality Date   • CARDIAC CATHETERIZATION     • COLONOSCOPY     • CORONARY ANGIOPLASTY WITH STENT PLACEMENT      2 years ago   • CORONARY ARTERY BYPASS GRAFT     • ENDOSCOPY N/A 2021    Procedure: ESOPHAGOGASTRODUODENOSCOPY;  Surgeon: Lance Walton MD;  [facility];  Service: Gastroenterology;  Laterality: N/A;   • HERNIA REPAIR         Family History: His family history includes Breast cancer in his sister; Diabetes in his mother; Heart disease in his father and mother; No Known Problems in his brother.     Social History: He  reports that he has never smoked. He has never used smokeless tobacco. He reports that he does not drink alcohol and does not use drugs.    Home Medications:  HYDROcodone-acetaminophen, amLODIPine, amoxicillin-clavulanate XR, aspirin, atorvastatin, clopidogrel, famotidine, ferrous sulfate, gabapentin, glipizide, hydroCHLOROthiazide, isosorbide mononitrate, losartan, metFORMIN ER, metoprolol succinate  "XL, nitroglycerin, pantoprazole, ranolazine, sucralfate, tamsulosin, and zolpidem    Current Medications:  •  fentaNYL citrate (PF)  •  midazolam  •  sodium bicarbonate  •  sodium chloride  •  sodium chloride     Allergies:  He has No Known Allergies.    Review of Systems    Objective     Visit Vitals  /65 (BP Location: Right arm)   Pulse 56   Temp 97.3 °F (36.3 °C) (Temporal)   Resp 14   Ht 177.8 cm (70\")   Wt 81 kg (178 lb 9.6 oz)   SpO2 96%   BMI 25.63 kg/m²        Physical Exam    A&Ox3. Able to communicate  No Apparent Distress  Average physique      Result Review      I have personally reviewed the results from the time of this admission to 2/24/2022 08:55 EST and agree with these findings.  [x]  Laboratory  []  Microbiology  [x]  Radiology  []  EKG/Telemetry   []  Cardiology/Vascular   []  Pathology  []  Old records  []  Other:    Most notable findings include: As noted:    Results from last 7 days   Lab Units 02/24/22  0802   INR  1.09   HEMOGLOBIN g/dL 9.1*   HEMATOCRIT % 28.6*   PLATELETS 10*3/mm3 285       Estimated Creatinine Clearance: 91.3 mL/min (by C-G formula based on SCr of 0.85 mg/dL).   Creatinine   Date Value Ref Range Status   02/24/2022 0.85 0.76 - 1.27 mg/dL Final       COVID19   Date Value Ref Range Status   11/24/2021 Presumptive Negative Presumptive Negative - Ref. Range Final          Assessment / Plan     Pedro Wilson is a 71 y.o. male referred to the IR service with above problem.    Plan:   As above.    Satya Brar MD   Vascular Interventional Radiology  02/24/22   8:55 AM EST   "

## 2022-02-24 NOTE — NURSING NOTE
CT guided bone marrow biopsy performed by MD Brar. Sample obtained, labeled, and taken to lab per CT tech. Patient tolerated well. 2 MG Versed and 100 MCG Fentanyl given during the procedure for a sedation time of 10 minutes. Report called to RN. FREDERIC

## 2022-02-24 NOTE — NURSING NOTE
Patient remains NPO for U/S that was ordered for tomorrow. They will do at 1430 if patient remains NPO. Resting quietly, VSS. No distress noted. Discharged to 1720 for U/S

## 2022-02-25 ENCOUNTER — TELEPHONE (OUTPATIENT)
Dept: INFUSION THERAPY | Facility: HOSPITAL | Age: 72
End: 2022-02-25

## 2022-02-25 ENCOUNTER — APPOINTMENT (OUTPATIENT)
Dept: CARDIOLOGY | Facility: HOSPITAL | Age: 72
End: 2022-02-25

## 2022-03-04 ENCOUNTER — OFFICE VISIT (OUTPATIENT)
Dept: ONCOLOGY | Facility: CLINIC | Age: 72
End: 2022-03-04

## 2022-03-04 VITALS
OXYGEN SATURATION: 97 % | BODY MASS INDEX: 25.91 KG/M2 | HEART RATE: 55 BPM | HEIGHT: 70 IN | SYSTOLIC BLOOD PRESSURE: 137 MMHG | RESPIRATION RATE: 16 BRPM | DIASTOLIC BLOOD PRESSURE: 63 MMHG | TEMPERATURE: 98.4 F | WEIGHT: 181 LBS

## 2022-03-04 DIAGNOSIS — D64.9 ANEMIA, UNSPECIFIED TYPE: Primary | Chronic | ICD-10-CM

## 2022-03-04 PROCEDURE — 99214 OFFICE O/P EST MOD 30 MIN: CPT | Performed by: INTERNAL MEDICINE

## 2022-03-04 NOTE — PROGRESS NOTES
"CHIEF COMPLAINT: Anemia    Problem List:  Oncology/Hematology History Overview Note   1.  Normochromic normocytic anemia  2.  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  TIA with chart stating \"carotid artery occlusion\"  4.  Hyperlipidemia  5.  Hypertension  6.  Diabetes  7.  BPH  8.  Ascending aortic aneurysm according to the chart problem list  9.  History of bradycardia according to the problem list in the chart  10.  Fibrotic lung disease according to the problem list in the chart    Hematology history timeline:  -1/3/2020 hemoglobin 11.2 MCV 92.4 otherwise normal CBC and differential  -10/5/2020 hemoglobin 10.7 MCV 91.9 otherwise unremarkable CBC and differential.  -11/9/2020 hemoglobin 10.3 MCV 92.2 otherwise unremarkable CBC and differential.  -3/1/2021 hemoglobin 9.3 with MCV 93.7 otherwise unremarkable CBC and differential.  -7/12/2021 hemoglobin 10.2 with normochromic normocytic indices and unremarkable CMP.  -11/24/2021 through 11/25/2021 inpatient hospitalization Dr. Pravin Pope.  Left heart cath planned but found to be anemic hemoglobin 7.7 with MCV 95 and normal white count and platelet count.  No differential.  BMP unremarkable.  Iron normal 63 with decreased transferrin 181 and decreased total iron binding capacity of 278 with iron saturation normal 23% and ferritin normal 237.2.  Folic acid normal 13.1.  B12 normal 346.  Hemoccult negative.  Reticulocyte count 1.17% EGD showed antral reactive changes with H. pylori negative.  Esophagus stomach and duodenum appeared normal.  Nonbleeding 12 mm duodenal diverticulum.  Per hospital records, the patient reportedly had upper endoscopy in [place] 1 year ago that showed evidence of chronic blood loss but details were not clear.  Last colonoscopy was in [place] 2-3 years ago unremarkable.  Denies change in the color caliber or consistency of his stools.  On aspirin.    -12/15/2021 hemoglobin up to 10.1 with unremarkable CBC.      -12/22/2021 " capsule endoscopy normal.  Hematology referral made.    -1/21/2022 initial [facility] hematology consultation: With his longstanding history of anemia according the patient, he will get his many of those records from [facility] as he can.  In the meantime I will do a hemolytic panel, megaloblastic panel, iron deficiency panel, and serum M panel but my suspicions for hemolysis, megaloblastic anemia, iron deficiency anemia, or plasma cell dyscrasia is fairly low with normal renal function and normal Red cell indices and Red cell distribution with.  I do suspect anemia of inflammation or less likely myelodysplasia.  The latter should also have elevated MCV but we may need to do a bone marrow biopsy if this first set of test is unrevealing.  In addition, though I do not palpate any hepatosplenomegaly we may do liver spleen ultrasound to look for sequestration but normally that would lower the white count and platelet count as well.    -1/21/2022 data:  Hemoglobin 9.3 with MCV normal 93.5 and white count 6900 with normal platelets 325,000 and unremarkable differential.  CMP unremarkable save for glucose 114.  Normal bilirubin.  Normal liver enzymes and total protein and globulin.  Serum M panel normal save for sedimentation rate: Total IgG high at 1873 with IgA high at 456 upper limit 437.  No monoclonality.  Balanced elevation of kappa light chain 64 and lambda 43 with normal ratio 1.48.  Sedimentation rate elevated at 40 with C-reactive protein less than 0.3.  Megaloblastic panel negative: B12 normal 337 with methylmalonic acid normal 290.  Folate normal 18.3 with homocystine normal 11.2.  Hemolytic panel negative: Haptoglobin normal 144 with reticulocyte and adequately normal at 1.81%.  [place] negative.  Erythropoietin inappropriately normal at 15.6.    -2/18/2022 [facility] hematology follow-up visit: I reviewed and entered the data he brought dating back from January 2020 through March 2021.  I reviewed the above data  that I drew on 1/21/2022 with him.  Other than for mild elevation of sedimentation rate, his serum M panel was negative.  Erythropoietin level is inappropriately normal suggesting that his marrow is not responding as does his normal retake count which should be elevated should he be losing blood or hemolyzing.  No evidence of hemolysis or megaloblastic process.  Hence no discernible reversible processes found on peripheral blood testing.  We will get his liver spleen ultrasound though serial CMP over the last 2 years has been normal and we will check a bone marrow biopsy to rule out myelodysplastic process.  Prior iron indices suggest normal iron.    -2/24/2022 duplex portal hepatic ultrasound shows no sonographic evidence of portal hypertension.  Bone marrow biopsy path shows mildly hypercellular marrow with maturing trilineage hematopoiesis and slight increase erythropoiesis.  Increase stainable marrow iron.  No increased blast, reticulin fibrosis, lymphoma, or clear-cut dysplasia.  No explanation for his macrocytic anemia on bone marrow path.  FISH panel has been ordered and addendum will be reported.     Anemia       HISTORY OF PRESENT ILLNESS:  The patient is a 71 y.o. male, here for follow up on management of anemia.    Past Medical History:   Diagnosis Date   • Back pain    • BPH (benign prostatic hyperplasia)    • Carotid artery occlusion    • Coronary artery disease    • Diabetes mellitus (HCC)    • Gastric bleeding    • GERD (gastroesophageal reflux disease)    • Herniated lumbar intervertebral disc    • Hyperlipidemia    • Hypertension    • Old MI (myocardial infarction)    • TIA (transient ischemic attack)      Past Surgical History:   Procedure Laterality Date   • CARDIAC CATHETERIZATION     • COLONOSCOPY     • CORONARY ANGIOPLASTY WITH STENT PLACEMENT      2 years ago   • CORONARY ARTERY BYPASS GRAFT     • ENDOSCOPY N/A 11/24/2021    Procedure: ESOPHAGOGASTRODUODENOSCOPY;  Surgeon: Lance Walton MD;  " Location: [facility] ENDOSCOPY;  Service: Gastroenterology;  Laterality: N/A;   • HERNIA REPAIR         No Known Allergies    Family History and Social History reviewed and changed as necessary    REVIEW OF SYSTEM:   No new somatic complaints    PHYSICAL EXAM:  No jaundice or icterus.  No hepatosplenomegaly.    Vitals:    03/04/22 1111   BP: 137/63   Pulse: 55   Resp: 16   Temp: 98.4 °F (36.9 °C)   SpO2: 97%   Weight: 82.1 kg (181 lb)   Height: 177.8 cm (70\")     Vitals:    03/04/22 1111   PainSc: 0-No pain          ECOG score: 1           Vitals reviewed.    ECOG: (0) Fully Active - Able to Carry On All Pre-disease Performance Without Restriction    Lab Results   Component Value Date    HGB 9.1 (L) 02/24/2022    HCT 28.6 (L) 02/24/2022    MCV 98.6 (H) 02/24/2022     02/24/2022    WBC 6.92 02/24/2022    NEUTROABS 3.71 02/24/2022    LYMPHSABS 2.05 02/24/2022    MONOSABS 0.52 02/24/2022    EOSABS 0.58 (H) 02/24/2022    BASOSABS 0.04 02/24/2022       Lab Results   Component Value Date    GLUCOSE 147 (H) 02/24/2022    BUN 17 02/24/2022    CREATININE 0.85 02/24/2022     02/24/2022    K 4.5 02/24/2022     02/24/2022    CO2 22.0 02/24/2022    CALCIUM 8.8 02/24/2022    PROTEINTOT 7.7 01/21/2022    ALBUMIN 4.10 01/21/2022    ALBUMIN 3.6 01/21/2022    BILITOT 0.4 01/21/2022    ALKPHOS 51 01/21/2022    AST 17 01/21/2022    ALT 18 01/21/2022             ASSESSMENT & PLAN:  1.  Normochromic normocytic anemia  2.  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  TIA with chart stating \"carotid artery occlusion\"  4.  Hyperlipidemia  5.  Hypertension  6.  Diabetes  7.  BPH  8.  Ascending aortic aneurysm according to the chart problem list  9.  History of bradycardia according to the problem list in the chart  10.  Fibrotic lung disease according to the problem list in the chart    Hematology history timeline:  -1/3/2020 hemoglobin 11.2 MCV 92.4 otherwise normal CBC and differential  -10/5/2020 hemoglobin 10.7 MCV " 91.9 otherwise unremarkable CBC and differential.  -11/9/2020 hemoglobin 10.3 MCV 92.2 otherwise unremarkable CBC and differential.  -3/1/2021 hemoglobin 9.3 with MCV 93.7 otherwise unremarkable CBC and differential.  -7/12/2021 hemoglobin 10.2 with normochromic normocytic indices and unremarkable CMP.  -11/24/2021 through 11/25/2021 inpatient hospitalization Dr. Pravin Pope.  Left heart cath planned but found to be anemic hemoglobin 7.7 with MCV 95 and normal white count and platelet count.  No differential.  BMP unremarkable.  Iron normal 63 with decreased transferrin 181 and decreased total iron binding capacity of 278 with iron saturation normal 23% and ferritin normal 237.2.  Folic acid normal 13.1.  B12 normal 346.  Hemoccult negative.  Reticulocyte count 1.17% EGD showed antral reactive changes with H. pylori negative.  Esophagus stomach and duodenum appeared normal.  Nonbleeding 12 mm duodenal diverticulum.  Per hospital records, the patient reportedly had upper endoscopy in [place] 1 year ago that showed evidence of chronic blood loss but details were not clear.  Last colonoscopy was in [place] 2-3 years ago unremarkable.  Denies change in the color caliber or consistency of his stools.  On aspirin.    -12/15/2021 hemoglobin up to 10.1 with unremarkable CBC.      -12/22/2021 capsule endoscopy normal.  Hematology referral made.    -1/21/2022 initial [facility] hematology consultation: With his longstanding history of anemia according the patient, he will get his many of those records from [facility] as he can.  In the meantime I will do a hemolytic panel, megaloblastic panel, iron deficiency panel, and serum M panel but my suspicions for hemolysis, megaloblastic anemia, iron deficiency anemia, or plasma cell dyscrasia is fairly low with normal renal function and normal Red cell indices and Red cell distribution with.  I do suspect anemia of inflammation or less likely myelodysplasia.  The latter  should also have elevated MCV but we may need to do a bone marrow biopsy if this first set of test is unrevealing.  In addition, though I do not palpate any hepatosplenomegaly we may do liver spleen ultrasound to look for sequestration but normally that would lower the white count and platelet count as well.    -1/21/2022 data:  Hemoglobin 9.3 with MCV normal 93.5 and white count 6900 with normal platelets 325,000 and unremarkable differential.  CMP unremarkable save for glucose 114.  Normal bilirubin.  Normal liver enzymes and total protein and globulin.  Serum M panel normal save for sedimentation rate: Total IgG high at 1873 with IgA high at 456 upper limit 437.  No monoclonality.  Balanced elevation of kappa light chain 64 and lambda 43 with normal ratio 1.48.  Sedimentation rate elevated at 40 with C-reactive protein less than 0.3.  Megaloblastic panel negative: B12 normal 337 with methylmalonic acid normal 290.  Folate normal 18.3 with homocystine normal 11.2.  Hemolytic panel negative: Haptoglobin normal 144 with reticulocyte and adequately normal at 1.81%.  [place] negative.  Erythropoietin inappropriately normal at 15.6.    -2/18/2022 [facility] hematology follow-up visit: I reviewed and entered the data he brought dating back from January 2020 through March 2021.  I reviewed the above data that I drew on 1/21/2022 with him.  Other than for mild elevation of sedimentation rate, his serum M panel was negative.  Erythropoietin level is inappropriately normal suggesting that his marrow is not responding as does his normal retic count which should be elevated should he be losing blood or hemolyzing.  No evidence of hemolysis or megaloblastic process.  Hence no discernible reversible processes found on peripheral blood testing.  We will get his liver spleen ultrasound though serial CMP over the last 2 years has been normal and we will check a bone marrow biopsy to rule out myelodysplastic process.  Prior iron indices  suggest normal iron.    -2/24/2022 duplex portal hepatic ultrasound shows no sonographic evidence of portal hypertension.  Bone marrow biopsy path shows mildly hypercellular marrow with maturing trilineage hematopoiesis and slight increase erythropoiesis.  Increase stainable marrow iron.  No increased blast, reticulin fibrosis, lymphoma, or clear-cut dysplasia.  No explanation for his macrocytic anemia on bone marrow path.  FISH panel has been ordered and addendum will be reported.    -3/4/2022 [facility] hematology follow-up visit: No evidence of portal hypertension on ultrasound.  Marrow essentially unremarkable though FISH and cytogenetics are pending but no clear-cut myelodysplasia.  Hence I have no good explanations for his anemia but at least, as long as it is stable, I would do no further testing and just transfuse as need be as symptoms arise or hemoglobin drops to the sixes.  If hemoglobin continues to fall, may give a trial of Procrit.  We will get his blood counts monthly and see him back in 4 or 5 months.    Total time of care today inclusive of time spent today prior to patient's arrival reviewing interval ultrasound and marrow results and during visit translating that to him and going over the broad differential that we have now been through and found no obvious explanations for his anemia and after visit instituting the plan as outlined above took 30 minutes of patient care time throughout the day today.  Drew Cantu MD    03/04/2022

## 2022-03-08 LAB
BH CV VAS HEPATIC PORTAL SPLEEN LENGTH: 8.7 CM
BH CV VAS HEPATIC PORTAL SPLEEN WIDTH: 3.31 CM
BH CV VAS HEPATIC PORTAL VEIN DIAMETER: 1.05 CM
BH CV VAS HEPATOPORTAL AORTA AP DIAM: 2.7 CM
BH CV VAS HEPATOPORTAL HEPATIC V LT DIRECTION: NORMAL
BH CV VAS HEPATOPORTAL HEPATIC V LT FLOW: NORMAL
BH CV VAS HEPATOPORTAL HEPATIC V MID DIRECTION: NORMAL
BH CV VAS HEPATOPORTAL HEPATIC V MID FLOW: NORMAL
BH CV VAS HEPATOPORTAL HEPATIC V RT DIRECTION: NORMAL
BH CV VAS HEPATOPORTAL HEPATIC V RT FLOW: NORMAL
BH CV VAS HEPATOPORTAL PORTAL V LT INTRA DIRECTION: NORMAL
BH CV VAS HEPATOPORTAL PORTAL V LT INTRA FLOW: NORMAL
BH CV VAS HEPATOPORTAL PORTAL V LT INTRA SPONT: NORMAL
BH CV VAS HEPATOPORTAL PORTAL V MAIN INTRA DIRECTION: NORMAL
BH CV VAS HEPATOPORTAL PORTAL V MAIN INTRA FLOW: NORMAL
BH CV VAS HEPATOPORTAL PORTAL V MAIN INTRA SPONT: NORMAL
BH CV VAS HEPATOPORTAL PORTAL V PSV: 31.69 CM/S
BH CV VAS HEPATOPORTAL PORTAL V RT INTRA DIRECTION: NORMAL
BH CV VAS HEPATOPORTAL PORTAL V RT INTRA FLOW: NORMAL
BH CV VAS HEPATOPORTAL PORTAL V RT INTRA SPONT: NORMAL
BH CV VAS HEPATOPORTAL SMV DIRECTION: NORMAL
BH CV VAS HEPATOPORTAL SMV FLOW: NORMAL
BH CV VAS HEPATOPORTAL SMV PSV: 36.38 CM/S
BH CV VAS HEPATOPORTAL SMV SPONT: NORMAL
BH CV VAS HEPATOPORTAL SPLENIC DIRECTION: NORMAL
BH CV VAS HEPATOPORTAL SPLENIC FLOW: NORMAL
BH CV VAS HEPATOPORTAL SPLENIC SPONT: NORMAL
BH CV VAS HEPATOPORTAL SPLENIC V PSV: 31.7 CM/S
BH CV VAS SMA AORTA PSV: 57.32 CM/S
BH CV VAS SMA HEPATIC A RI: 0.79
BH CV VAS SMA HEPATIC EDV: 12.09 CM/S
BH CV VAS SMA HEPATIC PSV: 56.86 CM/S
BH CV VAS SMA SPLENIC A RI: 0.78
BH CV VAS SMA SPLENIC EDV: 9.07 CM/S
BH CV VAS SMA SPLENIC PSV: 41.48 CM/S

## 2022-03-15 LAB
CYTO UR: NORMAL
LAB AP ASPIRATE SMEAR: NORMAL
LAB AP CASE REPORT: NORMAL
LAB AP CLINICAL INFORMATION: NORMAL
LAB AP CLOT SECTION: NORMAL
LAB AP CORE BIOPSY: NORMAL
LAB AP DIAGNOSIS COMMENT: NORMAL
LAB AP FLOW CYTOMETRY SUMMARY: NORMAL
LAB AP INTEGRATED ONCOLOGY, ADDENDUM: NORMAL
PATH REPORT.FINAL DX SPEC: NORMAL
PATH REPORT.GROSS SPEC: NORMAL

## (undated) DEVICE — SPNG VERSALON 4X4 4PLY NONSTRL LF BG/200

## (undated) DEVICE — SINGLE-USE BIOPSY FORCEPS: Brand: RADIAL JAW 4

## (undated) DEVICE — LUBE GEL ENDOGLIDE 1.1OZ

## (undated) DEVICE — SUCTION CANISTER, 1000CC,SAFELINER: Brand: DEROYAL

## (undated) DEVICE — SOL IRR H2O BTL 1000ML STRL

## (undated) DEVICE — CONTN GRAD MEAS TRIANG 32OZ BLK

## (undated) DEVICE — SYR LUERLOK 50ML

## (undated) DEVICE — HYBRID CO2 TUBING/CAP SET FOR OLYMPUS® SCOPES & CO2 SOURCE: Brand: ERBE

## (undated) DEVICE — THE BITE BLOCK MAXI, LATEX FREE STRAP IS USED TO PROTECT THE ENDOSCOPE INSERTION TUBE FROM BEING BITTEN BY THE PATIENT.

## (undated) DEVICE — TUBING, SUCTION, 1/4" X 10', STRAIGHT: Brand: MEDLINE

## (undated) DEVICE — INTRO ACCSR BLNT TP

## (undated) DEVICE — KT ORCA ORCAPOD DISP STRL